# Patient Record
Sex: FEMALE | Race: WHITE
[De-identification: names, ages, dates, MRNs, and addresses within clinical notes are randomized per-mention and may not be internally consistent; named-entity substitution may affect disease eponyms.]

---

## 2019-03-01 ENCOUNTER — HOSPITAL ENCOUNTER (INPATIENT)
Dept: HOSPITAL 25 - PMRU | Age: 74
LOS: 7 days | Discharge: HOME | DRG: 561 | End: 2019-03-08
Attending: PHYSICAL MEDICINE & REHABILITATION | Admitting: PHYSICAL MEDICINE & REHABILITATION
Payer: MEDICARE

## 2019-03-01 DIAGNOSIS — Z79.1: ICD-10-CM

## 2019-03-01 DIAGNOSIS — E55.9: ICD-10-CM
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
## 2019-03-01 DIAGNOSIS — I95.9: ICD-10-CM

## 2019-03-01 DIAGNOSIS — Z82.49: ICD-10-CM

## 2019-03-01 DIAGNOSIS — R32: ICD-10-CM

## 2019-03-01 DIAGNOSIS — Z96.651: ICD-10-CM

## 2019-03-01 DIAGNOSIS — Z79.899: ICD-10-CM

## 2019-03-01 DIAGNOSIS — I10: ICD-10-CM

## 2019-03-01 DIAGNOSIS — Z80.42: ICD-10-CM

## 2019-03-01 DIAGNOSIS — E78.5: ICD-10-CM

## 2019-03-01 DIAGNOSIS — J44.9: ICD-10-CM

## 2019-03-01 DIAGNOSIS — Z91.040: ICD-10-CM

## 2019-03-01 DIAGNOSIS — R05: ICD-10-CM

## 2019-03-01 DIAGNOSIS — S72.141D: Primary | ICD-10-CM

## 2019-03-01 DIAGNOSIS — Z80.3: ICD-10-CM

## 2019-03-01 PROCEDURE — 85025 COMPLETE CBC W/AUTO DIFF WBC: CPT

## 2019-03-01 PROCEDURE — 36415 COLL VENOUS BLD VENIPUNCTURE: CPT

## 2019-03-01 PROCEDURE — 80053 COMPREHEN METABOLIC PANEL: CPT

## 2019-03-03 PROCEDURE — F08Z3ZZ FEEDING/EATING TREATMENT: ICD-10-PCS | Performed by: PHYSICAL MEDICINE & REHABILITATION

## 2019-03-03 PROCEDURE — F07Z5ZZ BED MOBILITY TREATMENT: ICD-10-PCS | Performed by: PHYSICAL MEDICINE & REHABILITATION

## 2019-03-03 PROCEDURE — F08Z0ZZ BATHING/SHOWERING TECHNIQUES TREATMENT: ICD-10-PCS | Performed by: PHYSICAL MEDICINE & REHABILITATION

## 2019-03-03 PROCEDURE — F07Z9ZZ GAIT TRAINING/FUNCTIONAL AMBULATION TREATMENT: ICD-10-PCS | Performed by: PHYSICAL MEDICINE & REHABILITATION

## 2019-03-03 PROCEDURE — F08Z1ZZ DRESSING TECHNIQUES TREATMENT: ICD-10-PCS | Performed by: PHYSICAL MEDICINE & REHABILITATION

## 2019-03-03 PROCEDURE — F07Z8ZZ TRANSFER TRAINING TREATMENT: ICD-10-PCS | Performed by: PHYSICAL MEDICINE & REHABILITATION

## 2019-03-03 RX ADMIN — ATORVASTATIN CALCIUM SCH MG: 10 TABLET, FILM COATED ORAL at 16:51

## 2019-03-03 RX ADMIN — ACETAMINOPHEN PRN MG: 325 TABLET ORAL at 16:50

## 2019-03-03 RX ADMIN — ACETAMINOPHEN PRN MG: 325 TABLET ORAL at 20:50

## 2019-03-03 RX ADMIN — Medication SCH TAB: at 20:50

## 2019-03-03 RX ADMIN — ENOXAPARIN SODIUM SCH: 40 INJECTION SUBCUTANEOUS at 15:40

## 2019-03-03 RX ADMIN — TRAMADOL HYDROCHLORIDE PRN MG: 50 TABLET, FILM COATED ORAL at 18:54

## 2019-03-03 RX ADMIN — DOCUSATE SODIUM SCH MG: 100 CAPSULE, LIQUID FILLED ORAL at 20:50

## 2019-03-03 NOTE — HP
CC:  Dr. Salazar; Dr. Velasquez *

 

REHABILITATION ADMISSION:

 

DATE OF ADMISSION:  03/03/19

 

PRIMARY CARE PROVIDER:  Dr. Salazar.

 

ORTHOPEDIC SURGEON:  Dr. Velasquez.

 

REASON FOR ADMISSION:  Right hip fracture.

 

HISTORY OF PRESENT ILLNESS:  This is a 73-year-old woman who on 02/25/19 was at 
[facility].  She was bent over picking up packages and next thing she knew she was 
on the floor.  She was originally taken to the emergency room at [facility], where she was found to have a right hip fracture and then she was 
transferred to [facility].  She was seen by Dr. Velasquez and taken to 
the OR on 02/27/19 for closed reduction and percutaneous IM fixation with short 
gamma nail of a right comminuted intertrochanteric fracture.  She was given 
weightbearing as tolerated restrictions and started on Lovenox 40 mg daily for 
4 weeks for DVT prophylaxis. In the postoperative phase, it has been noted that 
she has had some increasing tachycardia and some lower blood pressures.  She 
has been given boluses and increased rates of IV fluids for some orthostasis as 
well as calcium supplementation.  This has helped some, but she is remained 
with mild degrees of tachycardia and systolic blood pressures in the 90s 
intermittently.  At times, she may feel a little lightheaded in transitioning 
from sitting to standing.  Some dizziness predated her fall.  She denies any 
chest pain, shortness of breath or pleurisy.  She has a chronic cough that 
predates her admission as well.  Prior to admission, she was independent with 
ADLs with mobility without any assistive device.  She was modified independent 
for toileting and showering using adaptive equipment.  With physical therapy 
here, she has required a moderate amount of assistance for bed mobility, 
minimal amount of assistance for transferring with a rolling walker and contact 
guard assistance for ambulating up to 50 feet.  With occupational therapy, she 
has required total assist for lower body dressing, maximum amount of assistance 
for bathing and a total amount assistance for toileting.

 

PAST MEDICAL HISTORY:

1.  Hyperlipidemia.

2.  Hypertension.

3.  Status post right total knee replacement by Dr. Gautam.

4.  Status post tonsillectomy and adenoidectomy.

5.  History of COPD.

 

MEDICATIONS:  Currently:

1.  Multivitamin daily.

2.  Prenatal multivitamin daily.

3.  Vitamin C 250 mg daily.

4.  Lipitor 10 mg daily, substitute for Simvastatin 20 mg daily.

5.  Zyrtec 10 mg daily, substitute for Claritin 10 mg daily.

6.  Anoro Ellipta  62.5/25 one puff daily, patient using her own.

7.  Flonase 2 sprays bilaterally daily, which is substituting for Nasonex that 
she uses at home.

8.  Tylenol p.r.n.

9.  Lovenox 40 mg subcutaneously daily.

10.  Roxycodone 5 mg q.4 hours p.r.n. pain.

11.  Tramadol 50 mg q. 6 hours p.r.n. pain.

12.  Colace 100 mg b.i.d.

13.  Senna 2 tablets q.p.m.

14.  Tessalon 100 mg t.i.d. p.r.n. cough that she has only taken once.

 

ALLERGIES:  LATEX and no known drug allergies.

 

FAMILY HISTORY:  Mother, hypertension.  Father, prostate cancer.  Sister, 
breast cancer.

 

SOCIAL HISTORY:  She lives alone outside of [place].  She is  since 
July 2018.  No smoking or alcohol.  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  Her daughter-in-law, Sonia Sanchez, is her 
healthcare proxy if she cannot make decisions for herself.  Her phone number is 
[phone].  Her home is 1 level with no steps to enter.  There is 1 step 
however into her washroom.

 

REVIEW OF SYSTEMS:  See history of present illness and past medical history.  
The remainder of 13-system review was completed.  No other significant findings.

 

                               PHYSICAL EXAMINATION

 

GENERAL: Well developed, well nourished, appearing stated age. 



Mental Status: No acute distress. Alert and oriented x3.

 

VITAL SIGNS:  Temperature 98.2, heart rate 101, respirations 16, oxygen 
saturation 95% on room air, blood pressure 93/64.

 

HEENT:  Normocephalic, atraumatic.  Oropharynx is clear.  Moist mucous 
membranes.

 

LUNGS:  Shows bilateral inspiratory rales at the bases.

 

ABDOMEN:  Active bowel sounds.  Soft, nontender, nondistended.

 

EXTREMITIES:  No clubbing or cyanosis.  She has swelling throughout the right 
thigh and lower leg, more so than the left.  Negative Homans'.  No calf 
tenderness.

 

MUSCULOSKELETAL EXAM:  She has functional range of motion of all her major 
joints with limited testing of the right hip and knee secondary to her recent 
surgery.

 

NEUROLOGICAL EXAM:  Cranial nerves II through XII are intact.  Upper and lower 
extremity motor 5/5 bilaterally with normal sensation with limited testing at 
the right hip and knee secondary to pain.

 

 LABORATORY DATA:  Today, her white blood cell count is 6.5, hemoglobin 9, 
hematocrit 27, platelets 150.  Sodium 139, potassium 3.8, BUN 11, creatinine 
0.47, calcium 7.9, ionized calcium 1.14.  Of note, she had a normal lactic acid 
level of 1.8 on 03/01/19 and serum osmolality was 289.

 

IMPRESSION:  A 73-year-old woman with right hip fracture.  She will be admitted 
to [facility], so she can return to independent living.

 

PLAN:

1.  Right hip fracture:  She needs to follow up with Dr. Velasquez.  Continue 
weightbearing as tolerated to the right lower extremity.  She has been taking 
minimal pain medicines and I will simplify her regimen to tramadol alone on an 
as needed basis.  If she needs additional medication, we could add back in 
oxycodone 5 mg q.4 hours p.r.n.

2.  DVT prophylaxis:  Continue with Lovenox.  She will need to be taught how to 
do this.

3.  Tachycardia and mild hypotension:  She is still being evaluated by the 
hospitalist for this.  There is concern of increased urine output and they have 
ordered labs to evaluate for diabetes insipidus and endocrinology consultation.
  Continue to encourage oral fluids, but be aware of fluid overload as she 
already has some rales in both of her lungs.

4.  Fluid, electrolytes, and nutrition:  She has a history of vitamin D 
deficiency and normally takes a calcium supplement.  I will also add an Oscal D 
one p.o. b.i.d. for now and she will have routine labs starting tomorrow.

5.  Acute postoperative anemia:  Follow up with labs tomorrow.  Some of this 
may be dilutional as she has been receiving IV fluids to try to minimize 
tachycardia and improve her blood pressure.

6.  Chronic obstructive pulmonary disease:  Continue with her current 
medications.

7.  Impaired mobility:  She will be seen by Physical Therapy for bed mobility, 
transfer, gait, and stair training using the least restrictive assistive device.

8.  Impaired self-care:  She will be seen by Occupational Therapy for ADL and 
IADL training and equipment evaluation.

9.  Advance directives.  She is a full code.  If she cannot make decisions for 
herself, her daughter-in-law, Sonia Sanchez makes decisions for her.  Her phone 
number is [phone].

 

ESTIMATED LENGTH OF STAY:  Approximately 10 to 14 days and return to home.

 

 

 

[id number]/CPS #: [id number]

JAYDEN

## 2019-03-04 LAB
ALBUMIN SERPL BCG-MCNC: 2.7 G/DL (ref 3.2–5.2)
ALBUMIN/GLOB SERPL: 1 {RATIO} (ref 1–3)
ALP SERPL-CCNC: 55 U/L (ref 34–104)
ALT SERPL W P-5'-P-CCNC: 15 U/L (ref 7–52)
ANION GAP SERPL CALC-SCNC: 5 MMOL/L (ref 2–11)
AST SERPL-CCNC: 23 U/L (ref 13–39)
BASOPHILS # BLD AUTO: 0 10^3/UL (ref 0–0.2)
BUN SERPL-MCNC: 11 MG/DL (ref 6–24)
BUN/CREAT SERPL: 20.8 (ref 8–20)
CALCIUM SERPL-MCNC: 8.4 MG/DL (ref 8.6–10.3)
CHLORIDE SERPL-SCNC: 106 MMOL/L (ref 101–111)
EOSINOPHIL # BLD AUTO: 0.7 10^3/UL (ref 0–0.6)
GLOBULIN SER CALC-MCNC: 2.6 G/DL (ref 2–4)
GLUCOSE SERPL-MCNC: 106 MG/DL (ref 70–100)
HCO3 SERPL-SCNC: 28 MMOL/L (ref 22–32)
HCT VFR BLD AUTO: 29 % (ref 35–47)
HGB BLD-MCNC: 9.7 G/DL (ref 12–16)
LYMPHOCYTES # BLD AUTO: 1.5 10^3/UL (ref 1–4.8)
MCH RBC QN AUTO: 32 PG (ref 27–31)
MCHC RBC AUTO-ENTMCNC: 33 G/DL (ref 31–36)
MCV RBC AUTO: 96 FL (ref 80–97)
MONOCYTES # BLD AUTO: 0.6 10^3/UL (ref 0–0.8)
NEUTROPHILS # BLD AUTO: 3.5 10^3/UL (ref 1.5–7.7)
NRBC # BLD AUTO: 0 10^3/UL
NRBC BLD QL AUTO: 0.1
PLATELET # BLD AUTO: 184 10^3/UL (ref 150–450)
POTASSIUM SERPL-SCNC: 3.8 MMOL/L (ref 3.5–5)
PROT SERPL-MCNC: 5.3 G/DL (ref 6.4–8.9)
RBC # BLD AUTO: 3.07 10^6/UL (ref 4–5.4)
SODIUM SERPL-SCNC: 139 MMOL/L (ref 135–145)
WBC # BLD AUTO: 6.3 10^3/UL (ref 3.5–10.8)

## 2019-03-04 RX ADMIN — Medication SCH TAB: at 20:41

## 2019-03-04 RX ADMIN — Medication SCH TAB: at 08:46

## 2019-03-04 RX ADMIN — UMECLIDINIUM BROMIDE AND VILANTEROL TRIFENATATE SCH INH: 62.5; 25 POWDER RESPIRATORY (INHALATION) at 09:40

## 2019-03-04 RX ADMIN — CETIRIZINE HYDROCHLORIDE SCH MG: 10 TABLET, FILM COATED ORAL at 08:46

## 2019-03-04 RX ADMIN — ACETAMINOPHEN PRN MG: 325 TABLET ORAL at 12:09

## 2019-03-04 RX ADMIN — DOCUSATE SODIUM SCH MG: 100 CAPSULE, LIQUID FILLED ORAL at 20:41

## 2019-03-04 RX ADMIN — ACETAMINOPHEN PRN MG: 325 TABLET ORAL at 03:51

## 2019-03-04 RX ADMIN — THERA TABS SCH TAB: TAB at 08:46

## 2019-03-04 RX ADMIN — ATORVASTATIN CALCIUM SCH MG: 10 TABLET, FILM COATED ORAL at 16:52

## 2019-03-04 RX ADMIN — OXYCODONE HYDROCHLORIDE AND ACETAMINOPHEN SCH MG: 500 TABLET ORAL at 08:45

## 2019-03-04 RX ADMIN — FLUTICASONE PROPIONATE SCH SPRAY: 50 SPRAY, METERED NASAL at 08:46

## 2019-03-04 RX ADMIN — DOCUSATE SODIUM SCH MG: 100 CAPSULE, LIQUID FILLED ORAL at 08:46

## 2019-03-04 RX ADMIN — TRAMADOL HYDROCHLORIDE PRN MG: 50 TABLET, FILM COATED ORAL at 20:41

## 2019-03-04 RX ADMIN — ENOXAPARIN SODIUM SCH MG: 40 INJECTION SUBCUTANEOUS at 09:40

## 2019-03-04 RX ADMIN — TRAMADOL HYDROCHLORIDE PRN MG: 50 TABLET, FILM COATED ORAL at 08:46

## 2019-03-04 NOTE — PN
Progress Note


Date of Service: 03/04/19


Note: 


BERONICA COLLINS was visited. Therapy notes read and reviewed. She has had a 

chronic cough for a year now and her doctor could not figure out why. She was 

supposed to see Dr. Mckeon, but broke her hip.  Her BPs have been soft








Current Medications: 


 Active Medications











Generic Name Dose Route Start Last Admin





  Trade Name Freq  PRN Reason Stop Dose Admin


 


Acetaminophen  650 mg  03/03/19 09:45  03/04/19 12:09





  Tylenol Tab*  PO   650 mg





  Q4H PRN   Administration





  FEVER > 101   





     





     





     


 


Al Hydrox/Mg Hydrox/Simethicone  30 ml  03/03/19 09:45  





  Maalox Plus*  PO   





  Q6H PRN   





  INDIGESTION   





     





     





     


 


Ascorbic Acid  250 mg  03/04/19 09:00  03/04/19 08:45





  Vitamin C  Tab*  PO   250 mg





  DAILY FARZANEH   Administration





     





     





     





     


 


Atorvastatin Calcium  10 mg  03/03/19 17:00  03/04/19 16:52





  Lipitor*  PO   10 mg





  1700 FARZANEH   Administration





     





     





     





     


 


Benzonatate  100 mg  03/03/19 10:01  





  Tessalon Cap*  PO   





  TID PRN   





  COUGH   





     





     





     


 


Bisacodyl  10 mg  03/03/19 09:45  





  Dulcolax Supp*  KS   





  DAILY PRN   





  CONSTIPATION   





     





     





     


 


Calcium/Vitamin D  1 tab  03/03/19 21:00  03/04/19 08:46





  Oscal D Tab 250/125*  PO   1 tab





  BID FARZANEH   Administration





     





     





     





     


 


Cetirizine HCl  10 mg  03/04/19 09:00  03/04/19 08:46





  Zyrtec*  PO   10 mg





  DAILY FARZANEH   Administration





     





     





     





     


 


Docusate Sodium  100 mg  03/03/19 21:00  03/04/19 08:46





  Colace Cap*  PO   100 mg





  BID FARZANEH   Administration





     





     





     





     


 


Enoxaparin Sodium  40 mg  03/03/19 10:00  03/04/19 09:40





  Lovenox(*)  SUBCUT   40 mg





  Q24H FARZANEH   Administration





     





     





     





     


 


Fluticasone Propionate  2 spray  03/04/19 09:00  03/04/19 08:46





  Flonase Nasal Spray 50mcg*  BOTH NARES   2 spray





  DAILY FARZANEH   Administration





     





     





     





     


 


Magnesium Hydroxide  30 ml  03/03/19 09:45  





  Milk Of Magnesia Liq*  PO   





  Q6H PRN   





  CONSTIPATION   





     





     





     


 


Multivitamins  1 tab  03/04/19 09:00  03/04/19 08:46





  Prenatal Vitamin Tab*  PO   1 tab





  DAILY FARZANEH   Administration





     





     





     





     


 


Multivitamins/Minerals  1 tab  03/04/19 09:00  03/04/19 08:46





  Theragran/Minerals Tab*  PO   1 tab





  DAILY FARZANEH   Administration





     





     





     





     


 


Senna  2 tab  03/03/19 09:45  





  Senokot Tab*  PO   





  BEDTIME PRN   





  CONSTIPATION   





     





     





     


 


Tramadol HCl  50 mg  03/03/19 10:00  03/04/19 08:46





  Ultram*  PO   50 mg





  Q6H PRN   Administration





  PAIN   





     





     





     


 


Umeclidinium/Vilanterol  1 inh  03/04/19 09:00  03/04/19 09:40





  Anoro 62.5/25 Ellipta Device (Nf)  INH   1 inh





  DAILY FARZANEH   Administration





     





     





  Protocol   





     











Vital Signs: 


 Vital Signs











Temp Pulse Resp BP Pulse Ox


 


 97.9 F   107   22   86/51   99 


 


 03/04/19 16:45  03/04/19 16:45  03/04/19 16:45  03/04/19 16:45  03/04/19 17:01











Lab Results: 


 Laboratory Results - last 24 hr











  03/04/19 03/04/19





  04:54 04:54


 


WBC  6.3 


 


RBC  3.07 L 


 


Hgb  9.7 L 


 


Hct  29 L 


 


MCV  96 


 


MCH  32 H 


 


MCHC  33 


 


RDW  14 


 


Plt Count  184 


 


MPV  8.0 


 


Neut % (Auto)  55.3 


 


Lymph % (Auto)  23.9 


 


Mono % (Auto)  8.8 


 


Eos % (Auto)  11.4 


 


Baso % (Auto)  0.6 


 


Absolute Neuts (auto)  3.5 


 


Absolute Lymphs (auto)  1.5 


 


Absolute Monos (auto)  0.6 


 


Absolute Eos (auto)  0.7 H 


 


Absolute Basos (auto)  0 


 


Absolute Nucleated RBC  0 


 


Nucleated RBC %  0.1 


 


Sodium   139


 


Potassium   3.8


 


Chloride   106


 


Carbon Dioxide   28


 


Anion Gap   5


 


BUN   11


 


Creatinine   0.53


 


Est GFR ( Amer)   136.1


 


Est GFR (Non-Af Amer)   112.5


 


BUN/Creatinine Ratio   20.8 H


 


Glucose   106 H


 


Calcium   8.4 L


 


Total Bilirubin   0.80


 


AST   23


 


ALT   15


 


Alkaline Phosphatase   55


 


Total Protein   5.3 L


 


Albumin   2.7 L


 


Globulin   2.6


 


Albumin/Globulin Ratio   1.0











Exam: 





LUNGS: Clear bilaterally


HEART: regular rhythm


ABDOMEN: Soft


EXTREMITIES: RLE Wound clean


NEUROLOGIC: A & O x 3. Sensation intact. Muscle strength 5/5 UE, RLE at least 4/

5


Assessment/Plan: 





1. Right Hip Fracture: WBAT. Follow up with Dr. Velasquez


2. Hypotension: Monitor


3. DVT Prophylaxis: Lovenox for 4 weeks post-op


4. Advanced Directives: Full code. Daughter in law is HCP


5. COPD: Anoro/Flonase











03/04/19 18:43





03/04/19 18:44

## 2019-03-05 RX ADMIN — TRAMADOL HYDROCHLORIDE PRN MG: 50 TABLET, FILM COATED ORAL at 10:07

## 2019-03-05 RX ADMIN — ACETAMINOPHEN PRN MG: 325 TABLET ORAL at 05:21

## 2019-03-05 RX ADMIN — FLUTICASONE PROPIONATE SCH SPRAY: 50 SPRAY, METERED NASAL at 08:40

## 2019-03-05 RX ADMIN — TRAMADOL HYDROCHLORIDE PRN MG: 50 TABLET, FILM COATED ORAL at 23:28

## 2019-03-05 RX ADMIN — DOCUSATE SODIUM SCH MG: 100 CAPSULE, LIQUID FILLED ORAL at 21:00

## 2019-03-05 RX ADMIN — ACETAMINOPHEN PRN MG: 325 TABLET ORAL at 21:03

## 2019-03-05 RX ADMIN — OXYCODONE HYDROCHLORIDE AND ACETAMINOPHEN SCH MG: 500 TABLET ORAL at 08:39

## 2019-03-05 RX ADMIN — ATORVASTATIN CALCIUM SCH MG: 10 TABLET, FILM COATED ORAL at 16:10

## 2019-03-05 RX ADMIN — SENNOSIDES PRN TAB: 8.6 TABLET, FILM COATED ORAL at 21:00

## 2019-03-05 RX ADMIN — Medication SCH TAB: at 08:41

## 2019-03-05 RX ADMIN — THERA TABS SCH TAB: TAB at 08:41

## 2019-03-05 RX ADMIN — UMECLIDINIUM BROMIDE AND VILANTEROL TRIFENATATE SCH INH: 62.5; 25 POWDER RESPIRATORY (INHALATION) at 10:08

## 2019-03-05 RX ADMIN — Medication SCH TAB: at 21:01

## 2019-03-05 RX ADMIN — FLUTICASONE PROPIONATE SCH SPRAY: 50 SPRAY, METERED NASAL at 21:04

## 2019-03-05 RX ADMIN — Medication SCH TAB: at 08:40

## 2019-03-05 RX ADMIN — TRAMADOL HYDROCHLORIDE PRN MG: 50 TABLET, FILM COATED ORAL at 16:10

## 2019-03-05 RX ADMIN — DOCUSATE SODIUM SCH MG: 100 CAPSULE, LIQUID FILLED ORAL at 08:40

## 2019-03-05 RX ADMIN — CETIRIZINE HYDROCHLORIDE SCH MG: 10 TABLET, FILM COATED ORAL at 08:40

## 2019-03-05 RX ADMIN — ENOXAPARIN SODIUM SCH MG: 40 INJECTION SUBCUTANEOUS at 10:08

## 2019-03-05 NOTE — PMRUTEAM
PMRU: Team Meeting


Current Status: 


 Nursing: Current Status











Skin Deviations [Right Groin]  Rash


 


Skin Deviations [Bilateral     Other





Foot]                          


 


Skin Deviations [Bilateral Arm Bruise





]                              


 


Skin Deviations [Right Hip]    Incision


 


Skin Deviation Description [   s/p moisture per pt report; powder applied





Right Groin]                   


 


Skin Deviation Description [   dry skin





Bilateral Foot]                


 


Skin Deviation Description [   Proximal drsg in place





Right Hip]                     Distal incisions healing, MADELINE - staples intact


 


Bladder Current Status         Stress incontinence - walking to bathroom to void


 


Bowel Current Status           Walking to bathroom to void


 


Nutrition Current Status       75% of most meals


 


Medication Current Status      Pain meds given











 Physical Therapy: Current Status











Bed Mobility Assistance        Min Assist


 


Transfer Mobility Assistance   Contact Guard Assist


 


Transfer/Bed Mobility          Rolling Walker





Recommended Devices            


 


Ambulation Assistance          Supervision


 


Ambulation Assistive Devices   Rolling Walker


 


Number of Feet Patient         105





Ambulated                      


 


Stairs Assistance              Contact Guard Assist


 


Stairs Recommended Devices     Two Rails


 


Number of Stairs               5


 


Curb                           Not Tested














 Occupational Therapy: Current Status











Upper Body Dressing            Supervision


 


Lower Body Dressing            Max Asst


 


Bathing                        Mod Assist


 


Toileting                      Contact Guard Assist


 


Toilet Transfer                Contact Guard Assist


 


Shower Transfer                Contact Guard Assist


 


Eating                         Independent














 Rec Therapy: Current Status











Summary of Assessment and      Writer met with the patient to complete RT





Clinical Impression            assessment. Patient was open to meeting with





 writer and was visiting with her son at the time.





 Patient presented euthymic with congruent affect.





 With prompting, the patient identified a few





 interests. 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





 have time for hobbies. Patient has a puzzle book





 in her room, is open to continued leisure visits.


 


Treatment Goals                Patient will engage in recreation and leisure





 activities while on the unit.


 


Treatment Plan                 Provide and encourage involvement in RT services.





 Meet with patient regularly for 1:1 leisure





 sessions.














 Social Work: Current Status











Discharge Plan                 Return home with out patient therapy and family





 support


 


Potential for Family Training  TBD (pt lives alone but has support from her





 daughter in law)


 


Anticipated Discharge          Home





Destination                    


 


Anticipated Discharge          no available home care [facility] in [facility]





Destination Comment            


 


Discharge With                 out patient therapy and family support














 Nutrition: Current Status











Monitoring                     Pt admitted to PMRU s/p R hip fx and closed





 reduction and percutaneous IM fixation 2/27/19. Pt





 eating % of meals so far; no evidence





 difficulty chewing/swallowing per nursing





 assessments. Soft formed BM noted yesterday; loose





 BM today. Skin intact; Curry 20 (low risk). Pt





 on appropriate regular diet. Full nutrition





 assessment to follow per protocol.














Goals: 


 Physical Therapy: Initial Goals











Bed Mobility Assistance        Independent


 


Transfer Mobility Assistance   Independent


 


Transfer/Bed Mobility          Rolling Walker





Recommended Devices            


 


Ambulation                     Independent


 


Ambulation Recommended Devices Rolling Walker


 


Ambulation Distance            150


 


Stairs Assistance              Independent


 


Stair Recommended Devices      Two Rails


 


Number of Stairs               5


 


Home Exercise Program          Independent





Assistance                     














 Physical Therapy: Updated Goals











Transfer/Bed Mobility          Rolling Walker





Recommended Devices            














 Occupational Therapy: Initial Goals











Goals to be Completed in (Days 7-9





)                              


 


Upper Body Bathing Routine     Independent


 


Lower Body Bathing Routine     Modified Independent with


 


Upper Body Dressing Routine    Independent


 


Lower Body Dressing Routine    Modified Independent with


 


Toilet Hygeine and Clothing    Modified Independent with





Management Routine             


 


Toilet Transfer Routine        Modified Independent with


 


Step-In Shower Transfer        Supervision/Set Up





Routine                        


 


Functional Transfers for ADL   Modified Independent with


 


Grooming Routine               Independent


 


Feeding Routine                Independent














 Nursing: Goals











Bladder Goal                   Independent


 


Bowel Goal                     Independent


 


Nutrition Goal                 Independent - 100% of all meals


 


Medication Goal                Independent














 Nutrition: Goals











Intervention Goals             1. Intake will remain adequate to promote post-op





 healing





 2. Pt will maintain regular bowel pattern without





 constipation (or diarrhea)











 Social Work: Goals











Discharge Plan                 Return home with out patient therapy and family





 support


 


Potential for Family Training  TBD (pt lives alone but has support from her





 daughter in law)


 


Anticipated Discharge          Home





Destination                    


 


Anticipated Discharge          no available home care [facility] in [facility]





Destination Comment            


 


Discharge With                 out patient therapy and family support

















Care Plan: 


 Care Plan





ADL's - Improve/Maintain                Start:  03/03/19 16:41              


Freq:   DAILY                           Status: Active      Target:         


Protocol:                                                                   








Activity Type Activity Date Activity User E-Sign Co-Sign Detail





Recorded Client Recorded Date Recorded By   


 


Document 03/04/19 14:51 [id number]   





PMRU-C04 03/04/19 14:52 [id number]   














  03/04/19





  14:51


 


PMRU Outcome: ADL's/ADL Transfers 


 


Orders/Interventions Occupational





 Therapy





 Evaluation &





 Treatment





 Communication





 Tool in Patient





 Room


 


Patient to receive OT 5x/wk for  Therex





min/day Self Care





 Management


 


UE/LE ADL's with Assist Yes: mod I


 


ADL Transfers with Assist Yes: mod I


 


Toileting: Transfers,Clothing Management Yes: mod I





,Hygeine w/Assist 


 


Progression Toward Outcome/Goals Progressing


 


Outcome/Goals Met Pt is a 73 year





 old female s/p





 fall with





 right hip fx s/





 p gamma nailing





 with WBAT





 precautions. Pt





 currently has





 R hip pain,





 limited R hip





 AROM, decreased





 balance,





 decreased





 endurance which





 limits





 independence





 with bathing,





 dressing,





 toileting, and





 transfers. Pt





 will benefit





 from acute





 skilled OT





 intervention in





 a





 rehabilitation





 setting to





 maximize





 independence





 and safety with





 ADLs and ADL





 transfers. Pt





 is agreeable to





 OT POC.








Cardiovascular- Improve/Maintain        Start:  03/03/19 16:41              


Freq:   QSHIFT                          Status: Active      Target:         


Protocol:                                                                   








Activity Type Activity Date Activity User E-Sign Co-Sign Detail





Recorded Client Recorded Date Recorded By   


 


Document 03/04/19 20:00 [id number]   





PMRU-C03 03/04/19 21:35 [id number]   














  03/04/19





  20:00


 


PMRU Outcome: Cardiovascular 


 


Vital Signs q Shift for 48hrs Then BID Yes


 


Daily Weight Ordered No


 


Current Cardiovascular Outcome/Goal Maintain/





 Achieve





 Baseline HR, BP





 , Perfusion





 Maintain/





 Achieve





 Hemodynamic





 Stability





 Free of





 Abnormal





 Cardiac





 Symptoms


 


Progression Toward Outcome/Goal Progressing








DVT Prophylaxis- Improve/Maintain       Start:  03/03/19 16:41              


Freq:   QSHIFT                          Status: Active      Target:         


Protocol:                                                                   








Activity Type Activity Date Activity User E-Sign Co-Sign Detail





Recorded Client Recorded Date Recorded By   


 


Document 03/04/19 20:00 [id number]   





PMRU-C03 03/04/19 21:35 [id number]   














  03/04/19





  20:00


 


PMRU Outcome: DVT Prophylaxis 


 


Outcome/Goals Remains Free of





 DVT





 Complies with





 DVT Prophylaxis





 /Treatment





 TEDS Stockings





 on Every AM,





 Off at HS


 


Progression Toward Outcome/Goals Progressing








Discharge Planning - Improve/Maintain   Start:  03/03/19 16:41              


Freq:   DAILY                           Status: Active      Target:         


Protocol:                                                                   








Activity Type Activity Date Activity User E-Sign Co-Sign Detail





Recorded Client Recorded Date Recorded By   


 


Document 03/05/19 00:25 [id number]   





PMRU-C14 03/05/19 00:25 [id number]   














  03/05/19





  00:25


 


PMRU Outcome: Discharge Planning 


 


Update Patient Family No


 


Outcome/Goals Demonstrates





 Understanding





 of Discharge





 Plan


 


Progression Toward Outcome/Goals Progressing








Education-Improve/Maintain              Start:  03/03/19 16:41              


Freq:   QSHIFT                          Status: Active      Target:         


Protocol:                                                                   








Activity Type Activity Date Activity User E-Sign Co-Sign Detail





Recorded Client Recorded Date Recorded By   


 


Document 03/04/19 20:00 [id number]   





PMRU-C03 03/04/19 21:35 [id number]   














  03/04/19





  20:00


 


PMRU Outcome: Education 


 


Outcome/Goals Demonstrate/





 Verbalize





 Understanding





 of Written





 Discharge





 Instructions





 Demonstrates





 Skills





 Encourage





 Questions








Pain/Comfort- Improve/Maintain          Start:  03/03/19 16:41              


Freq:   QSHIFT                          Status: Active      Target:         


Protocol:                                                                   








Activity Type Activity Date Activity User E-Sign Co-Sign Detail





Recorded Client Recorded Date Recorded By   


 


Document 03/04/19 20:00 [id number]   





[facility]-C03 03/04/19 21:35 [id number]   














  03/04/19





  20:00


 


PMRU Outcome: Pain/Comfort 


 


Outcome/Goals Demonstrates





 Knowledge and





 Use of





 Available





 Comfort





 Measures





 Achieves





 Acceptable





 Comfort/Pain





 Level as





 Determined by





 Patient/Condit





 Maintain





 Comfort Level





 Allowing





 Patient to





 Fully





 Participate in





 Rehab


 


Progression Toward Outcome/Goals Progressing








Respiratory - Improve/Maintain          Start:  03/03/19 16:41              


Freq:   QSHIFT                          Status: Active      Target:         


Protocol:                                                                   








Activity Type Activity Date Activity User E-Sign Co-Sign Detail





Recorded Client Recorded Date Recorded By   


 


Document 03/04/19 20:00 [id number]   





[facility] [place]-C03 03/04/19 21:35 [id number]   














  03/04/19





  20:00


 


PMRU Outcome: Respiratory 


 


Does Patient Have a Trach No


 


Outcome/Goals Maintain/





 Improve





 Activity





 Tolerance





 Prevent





 Pneumonia/





 Atelectasis


 


Progression Toward Outcome/Goals Progressing








Safety- Improve/Maintain                Start:  03/03/19 16:41              


Freq:   QSHIFT                          Status: Active      Target:         


Protocol:                                                                   








Activity Type Activity Date Activity User E-Sign Co-Sign Detail





Recorded Client Recorded Date Recorded By   


 


Document 03/04/19 20:00 [id number]   





[facility] [place]-C03 03/04/19 21:35 [id number]   














  03/04/19





  20:00


 


PMRU Outcome: Safety 


 


Outcome/Goals Remain Free of





 Injury or Harm





 Cooperates with





 Safety





 Measures for





 Least





 Restrictive





 Environment





 Prevent Falls/





 Injury


 


Progression Toward Outcome/Goals Progressing








Skin- Improve/Maintain                  Start:  03/03/19 16:41              


Freq:   QSHIFT                          Status: Active      Target:         


Protocol:                                                                   








Activity Type Activity Date Activity User E-Sign Co-Sign Detail





Recorded Client Recorded Date Recorded By   


 


Document 03/04/19 20:00 [id number]   





[facility] [place]-C03 03/04/19 21:35 [id number]   














  03/04/19





  20:00


 


PMRU Outcome: Skin 


 


Skin Risk Level Medium


 


Outcome/Goals Maintain/





 Improve Skin





 Intergrity





 Surgical





 Incisions





 Healing


 


Progression Toward Outcome/Goals Progressing














Medicine Note: 








Length of Stay:  1 week





Anticipated Discharge Destination: Home





Tentative Discharge Date:  03/12/19





Discharged to:  Home

## 2019-03-05 NOTE — PN
Progress Note


Date of Service: 03/05/19


Note: 


BERONICA COLLINS was visited. Therapy notes read and reviewed. She was discussed 

in interdisciplinary team rounds. Some concerns about incontinence when she 

stands. Will check PVR 








Current Medications: 


 Active Medications











Generic Name Dose Route Start Last Admin





  Trade Name Freq  PRN Reason Stop Dose Admin


 


Acetaminophen  650 mg  03/03/19 09:45  03/05/19 05:21





  Tylenol Tab*  PO   650 mg





  Q4H PRN   Administration





  FEVER > 101   





     





     





     


 


Al Hydrox/Mg Hydrox/Simethicone  30 ml  03/03/19 09:45  





  Maalox Plus*  PO   





  Q6H PRN   





  INDIGESTION   





     





     





     


 


Ascorbic Acid  250 mg  03/04/19 09:00  03/05/19 08:39





  Vitamin C  Tab*  PO   250 mg





  DAILY FARZANEH   Administration





     





     





     





     


 


Atorvastatin Calcium  10 mg  03/03/19 17:00  03/05/19 16:10





  Lipitor*  PO   10 mg





  1700 FARZANEH   Administration





     





     





     





     


 


Benzonatate  100 mg  03/03/19 10:01  





  Tessalon Cap*  PO   





  TID PRN   





  COUGH   





     





     





     


 


Bisacodyl  10 mg  03/03/19 09:45  





  Dulcolax Supp*  GA   





  DAILY PRN   





  CONSTIPATION   





     





     





     


 


Calcium/Vitamin D  1 tab  03/03/19 21:00  03/05/19 08:40





  Oscal D Tab 250/125*  PO   1 tab





  BID FARZANEH   Administration





     





     





     





     


 


Cetirizine HCl  10 mg  03/04/19 09:00  03/05/19 08:40





  Zyrtec*  PO   10 mg





  DAILY FARZANEH   Administration





     





     





     





     


 


Docusate Sodium  100 mg  03/03/19 21:00  03/05/19 08:40





  Colace Cap*  PO   100 mg





  BID FARZANEH   Administration





     





     





     





     


 


Enoxaparin Sodium  40 mg  03/03/19 10:00  03/05/19 10:08





  Lovenox(*)  SUBCUT   40 mg





  Q24H FARZANEH   Administration





     





     





     





     


 


Fluticasone Propionate  2 spray  03/05/19 21:00  





  Flonase Nasal Spray 50mcg*  BOTH NARES   





  BID FARZANEH   





     





     





     





     


 


Magnesium Hydroxide  30 ml  03/03/19 09:45  





  Milk Of Magnesia Liq*  PO   





  Q6H PRN   





  CONSTIPATION   





     





     





     


 


Multivitamins  1 tab  03/04/19 09:00  03/05/19 08:41





  Prenatal Vitamin Tab*  PO   1 tab





  DAILY FARZANEH   Administration





     





     





     





     


 


Multivitamins/Minerals  1 tab  03/04/19 09:00  03/05/19 08:41





  Theragran/Minerals Tab*  PO   1 tab





  DAILY FARZANEH   Administration





     





     





     





     


 


Senna  2 tab  03/03/19 09:45  





  Senokot Tab*  PO   





  BEDTIME PRN   





  CONSTIPATION   





     





     





     


 


Tramadol HCl  50 mg  03/03/19 10:00  03/05/19 16:10





  Ultram*  PO   50 mg





  Q6H PRN   Administration





  PAIN   





     





     





     


 


Umeclidinium/Vilanterol  1 inh  03/04/19 09:00  03/05/19 10:08





  Anoro 62.5/25 Ellipta Device (Nf)  INH   1 inh





  DAILY FARZANEH   Administration





     





     





  Protocol   





     











Vital Signs: 


 Vital Signs











Temp Pulse Resp BP Pulse Ox


 


 97.8 F   111   18   99/65   100 


 


 03/05/19 15:44  03/05/19 15:44  03/05/19 16:10  03/05/19 15:44  03/05/19 15:44











Exam: 





LUNGS: Clear bilaterally


HEART: regular rhythm


ABDOMEN: Soft


EXTREMITIES: RLE Wound clean


NEUROLOGIC: A & O x 3. Sensation intact. Muscle strength 5/5 UE, RLE at least 4/

5


Assessment/Plan: 





1. Right Hip Fracture: WBAT. Follow up with Dr. Velasquez


2. Hypotension: Monitor


3. DVT Prophylaxis: Lovenox for 4 weeks post-op


4. Advanced Directives: Full code. Daughter in law is HCP


5. COPD: Anoro/Flonase


6. Chronic Cough: has been looked at and a variety of medications have been 

tried. 


7. Urinary Incontinence: Predates her fall. Will bladder scan to make sure not 

overflow incontinence

















03/05/19 19:07





03/05/19 19:08

## 2019-03-06 RX ADMIN — THERA TABS SCH TAB: TAB at 09:09

## 2019-03-06 RX ADMIN — TRAMADOL HYDROCHLORIDE PRN MG: 50 TABLET, FILM COATED ORAL at 09:10

## 2019-03-06 RX ADMIN — DOCUSATE SODIUM SCH: 100 CAPSULE, LIQUID FILLED ORAL at 09:04

## 2019-03-06 RX ADMIN — Medication SCH TAB: at 09:09

## 2019-03-06 RX ADMIN — ATORVASTATIN CALCIUM SCH MG: 10 TABLET, FILM COATED ORAL at 17:10

## 2019-03-06 RX ADMIN — ENOXAPARIN SODIUM SCH MG: 40 INJECTION SUBCUTANEOUS at 09:11

## 2019-03-06 RX ADMIN — FLUTICASONE PROPIONATE SCH SPRAY: 50 SPRAY, METERED NASAL at 09:12

## 2019-03-06 RX ADMIN — CETIRIZINE HYDROCHLORIDE SCH MG: 10 TABLET, FILM COATED ORAL at 09:09

## 2019-03-06 RX ADMIN — GUAIFENESIN SCH MG: 600 TABLET, EXTENDED RELEASE ORAL at 19:09

## 2019-03-06 RX ADMIN — UMECLIDINIUM BROMIDE AND VILANTEROL TRIFENATATE SCH INH: 62.5; 25 POWDER RESPIRATORY (INHALATION) at 09:13

## 2019-03-06 RX ADMIN — Medication SCH TAB: at 19:09

## 2019-03-06 RX ADMIN — Medication SCH TAB: at 09:10

## 2019-03-06 RX ADMIN — ACETAMINOPHEN PRN MG: 325 TABLET ORAL at 04:50

## 2019-03-06 RX ADMIN — DOCUSATE SODIUM SCH MG: 100 CAPSULE, LIQUID FILLED ORAL at 19:09

## 2019-03-06 RX ADMIN — OXYCODONE HYDROCHLORIDE AND ACETAMINOPHEN SCH MG: 500 TABLET ORAL at 09:09

## 2019-03-06 RX ADMIN — ACETAMINOPHEN PRN MG: 325 TABLET ORAL at 19:08

## 2019-03-06 RX ADMIN — FLUTICASONE PROPIONATE SCH SPRAY: 50 SPRAY, METERED NASAL at 19:11

## 2019-03-06 RX ADMIN — ENOXAPARIN SODIUM SCH MG: 40 INJECTION SUBCUTANEOUS at 09:10

## 2019-03-06 RX ADMIN — SENNOSIDES PRN TAB: 8.6 TABLET, FILM COATED ORAL at 19:09

## 2019-03-06 NOTE — PN
Progress Note


Date of Service: 03/06/19


Note: 


BERONICA COLLINS was visited. Therapy notes read and reviewed. She feels that 

her cough may have been better when she took Mucinex and Claritin. I will 

order. Otherwise doing okay. 








Current Medications: 


 Active Medications











Generic Name Dose Route Start Last Admin





  Trade Name Freq  PRN Reason Stop Dose Admin


 


Acetaminophen  650 mg  03/03/19 09:45  03/06/19 04:50





  Tylenol Tab*  PO   650 mg





  Q4H PRN   Administration





  FEVER > 101   





     





     





     


 


Al Hydrox/Mg Hydrox/Simethicone  30 ml  03/03/19 09:45  





  Maalox Plus*  PO   





  Q6H PRN   





  INDIGESTION   





     





     





     


 


Ascorbic Acid  250 mg  03/04/19 09:00  03/06/19 09:09





  Vitamin C  Tab*  PO   250 mg





  DAILY FARZNAEH   Administration





     





     





     





     


 


Atorvastatin Calcium  10 mg  03/03/19 17:00  03/06/19 17:10





  Lipitor*  PO   10 mg





  1700 FARZANEH   Administration





     





     





     





     


 


Benzonatate  100 mg  03/03/19 10:01  





  Tessalon Cap*  PO   





  TID PRN   





  COUGH   





     





     





     


 


Bisacodyl  10 mg  03/03/19 09:45  





  Dulcolax Supp*  MO   





  DAILY PRN   





  CONSTIPATION   





     





     





     


 


Calcium/Vitamin D  1 tab  03/03/19 21:00  03/06/19 09:10





  Oscal D Tab 250/125*  PO   1 tab





  BID FARZANEH   Administration





     





     





     





     


 


Docusate Sodium  100 mg  03/03/19 21:00  03/06/19 09:04





  Colace Cap*  PO   Not Given





  BID FARZANEH   





     





     





     





     


 


Enoxaparin Sodium  40 mg  03/06/19 09:00  03/06/19 09:10





  Lovenox(*)  SUBCUT   40 mg





  Q24HR FARZANEH   Administration





     





     





     





     


 


Fluticasone Propionate  2 spray  03/05/19 21:00  03/06/19 09:12





  Flonase Nasal Spray 50mcg*  BOTH NARES   2 spray





  BID FARZANEH   Administration





     





     





     





     


 


Guaifenesin  600 mg  03/06/19 21:00  





  Mucinex*  PO   





  BID FARZANEH   





     





     





     





     


 


Magnesium Hydroxide  30 ml  03/03/19 09:45  





  Milk Of Magnesia Liq*  PO   





  Q6H PRN   





  CONSTIPATION   





     





     





     


 


Multivitamins  1 tab  03/04/19 09:00  03/06/19 09:09





  Prenatal Vitamin Tab*  PO   1 tab





  DAILY FARZANEH   Administration





     





     





     





     


 


Multivitamins/Minerals  1 tab  03/04/19 09:00  03/06/19 09:09





  Theragran/Minerals Tab*  PO   1 tab





  DAILY FARZANEH   Administration





     





     





     





     


 


Pto: (Claritin D 120  120 mg  03/07/19 09:00  





  Mg)  PO   





  BID FARZANEH   





     





     





     





     


 


Senna  2 tab  03/03/19 09:45  03/05/19 21:00





  Senokot Tab*  PO   2 tab





  BEDTIME PRN   Administration





  CONSTIPATION   





     





     





     


 


Tramadol HCl  50 mg  03/03/19 10:00  03/06/19 09:10





  Ultram*  PO   50 mg





  Q6H PRN   Administration





  PAIN   





     





     





     


 


Umeclidinium/Vilanterol  1 inh  03/04/19 09:00  03/06/19 09:13





  Anoro 62.5/25 Ellipta Device (Nf)  INH   1 inh





  DAILY FARZANEH   Administration





     





     





  Protocol   





     











Vital Signs: 


 Vital Signs











Temp Pulse Resp BP Pulse Ox


 


 98.3 F   99   18   107/67   100 


 


 03/06/19 16:18  03/06/19 16:18  03/06/19 16:18  03/06/19 16:18  03/06/19 16:18











Exam: 





LUNGS: Clear bilaterally


HEART: regular rhythm


ABDOMEN: Soft


EXTREMITIES: RLE Wound clean


NEUROLOGIC: A & O x 3. Sensation intact. Muscle strength 5/5 UE, RLE at least 4/

5


Assessment/Plan: 





1. Right Hip Fracture: WBAT. Follow up with Dr. Velasquez


2. Hypotension: Monitor


3. DVT Prophylaxis: Lovenox for 4 weeks post-op


4. Advanced Directives: Full code. Daughter in law is HCP


5. COPD: Anoro/Flonase


6. Chronic Cough: has been looked at and a variety of medications have been 

tried. 


7. Urinary Incontinence: Predates her fall. Bladder scan showed no PVR























03/06/19 18:29





03/06/19 18:30

## 2019-03-07 RX ADMIN — Medication SCH TAB: at 21:42

## 2019-03-07 RX ADMIN — Medication SCH MG: at 10:00

## 2019-03-07 RX ADMIN — OXYCODONE HYDROCHLORIDE AND ACETAMINOPHEN SCH MG: 500 TABLET ORAL at 10:01

## 2019-03-07 RX ADMIN — ACETAMINOPHEN PRN MG: 325 TABLET ORAL at 00:20

## 2019-03-07 RX ADMIN — Medication SCH TAB: at 10:01

## 2019-03-07 RX ADMIN — TRAMADOL HYDROCHLORIDE PRN MG: 50 TABLET, FILM COATED ORAL at 10:01

## 2019-03-07 RX ADMIN — Medication SCH MG: at 21:45

## 2019-03-07 RX ADMIN — UMECLIDINIUM BROMIDE AND VILANTEROL TRIFENATATE SCH INH: 62.5; 25 POWDER RESPIRATORY (INHALATION) at 10:03

## 2019-03-07 RX ADMIN — TRAMADOL HYDROCHLORIDE PRN MG: 50 TABLET, FILM COATED ORAL at 16:32

## 2019-03-07 RX ADMIN — ACETAMINOPHEN PRN MG: 325 TABLET ORAL at 21:43

## 2019-03-07 RX ADMIN — DOCUSATE SODIUM SCH MG: 100 CAPSULE, LIQUID FILLED ORAL at 10:01

## 2019-03-07 RX ADMIN — FLUTICASONE PROPIONATE SCH SPRAY: 50 SPRAY, METERED NASAL at 10:00

## 2019-03-07 RX ADMIN — THERA TABS SCH TAB: TAB at 10:00

## 2019-03-07 RX ADMIN — FLUTICASONE PROPIONATE SCH SPRAY: 50 SPRAY, METERED NASAL at 21:48

## 2019-03-07 RX ADMIN — ENOXAPARIN SODIUM SCH MG: 40 INJECTION SUBCUTANEOUS at 10:04

## 2019-03-07 RX ADMIN — Medication SCH TAB: at 10:00

## 2019-03-07 RX ADMIN — DOCUSATE SODIUM SCH MG: 100 CAPSULE, LIQUID FILLED ORAL at 21:42

## 2019-03-07 RX ADMIN — SENNOSIDES PRN TAB: 8.6 TABLET, FILM COATED ORAL at 21:43

## 2019-03-07 RX ADMIN — GUAIFENESIN SCH MG: 600 TABLET, EXTENDED RELEASE ORAL at 10:00

## 2019-03-07 RX ADMIN — GUAIFENESIN SCH MG: 600 TABLET, EXTENDED RELEASE ORAL at 21:42

## 2019-03-07 RX ADMIN — ATORVASTATIN CALCIUM SCH MG: 10 TABLET, FILM COATED ORAL at 16:32

## 2019-03-08 VITALS — SYSTOLIC BLOOD PRESSURE: 106 MMHG | DIASTOLIC BLOOD PRESSURE: 66 MMHG

## 2019-03-08 RX ADMIN — Medication SCH TAB: at 10:27

## 2019-03-08 RX ADMIN — TRAMADOL HYDROCHLORIDE PRN MG: 50 TABLET, FILM COATED ORAL at 14:35

## 2019-03-08 RX ADMIN — GUAIFENESIN SCH MG: 600 TABLET, EXTENDED RELEASE ORAL at 10:28

## 2019-03-08 RX ADMIN — UMECLIDINIUM BROMIDE AND VILANTEROL TRIFENATATE SCH INH: 62.5; 25 POWDER RESPIRATORY (INHALATION) at 10:29

## 2019-03-08 RX ADMIN — Medication SCH TAB: at 10:29

## 2019-03-08 RX ADMIN — Medication SCH MG: at 10:28

## 2019-03-08 RX ADMIN — ACETAMINOPHEN PRN MG: 325 TABLET ORAL at 01:44

## 2019-03-08 RX ADMIN — FLUTICASONE PROPIONATE SCH SPRAY: 50 SPRAY, METERED NASAL at 10:28

## 2019-03-08 RX ADMIN — THERA TABS SCH TAB: TAB at 10:28

## 2019-03-08 RX ADMIN — DOCUSATE SODIUM SCH MG: 100 CAPSULE, LIQUID FILLED ORAL at 10:28

## 2019-03-08 RX ADMIN — ENOXAPARIN SODIUM SCH MG: 40 INJECTION SUBCUTANEOUS at 10:28

## 2019-03-08 RX ADMIN — ACETAMINOPHEN PRN MG: 325 TABLET ORAL at 10:30

## 2019-03-08 RX ADMIN — OXYCODONE HYDROCHLORIDE AND ACETAMINOPHEN SCH MG: 500 TABLET ORAL at 10:27

## 2019-03-10 NOTE — DS
CC:  Dr. Greg Salazar *

 

DISCHARGE SUMMARY:

 

DATE OF ADMISSION:  03/03/19

 

DATE OF DISCHARGE:  03/08/19

 

DISCHARGE DIAGNOSES:

1.  Right hip fracture.

2.  Right total knee replacement, remote.

3.  COPD.

4.  Hypertension.

5.  Hyperlipidemia.

 

HISTORY OF PRESENT ILLNESS AND HOSPITAL COURSE:  For complete history of the 
events leading up to her rehab stay, please see the history and physical 
dictated by Dr. Mari Glez on 03/03/19.  While on the rehab unit, the 
patient continued to have a mild chronic cough, which she has had for over a 
year.  It has been worked up by her primary care doctor and she had an 
appointment to see Dr. Mckeon, but was not able to make it because of her hip.  
She had slightly low blood pressures on admission, but these normalized.  
Otherwise, she was fairly stable from a medical point of view.  She was 
maintained on Lovenox for DVT prophylaxis.  The patient otherwise was medically 
stable.  She was seen by both physical and occupational therapy and made good 
gains with both disciplines.  With physical therapy at the time of admission, 
the patient required min assist to do a transfer.  She was contact guard to 
ambulate with occupational therapy at the time of admission.  She was 
supervision for upper body dressing, max assist for lower body dressing, mod 
assist for bathing, mod assist for toilet transfers.  By the time of discharge, 
the patient was independent with transfers, independent ambulating 150 feet, 
independent going up and down 5 steps, independent with dressing, independent 
with toileting and toilet transfers.  The patient was discharged home on 03/08/
19.

 

DISCHARGE DIET:  Regular.

 

DISCHARGE MEDICATIONS:

1.  Vitamin C 250 mg daily.

2.  Tessalon Perles 100 mg three times a day as needed.

3.  Claritin-D 120 mg twice daily.

4.  Lovenox 40 mg daily for 15 days.

5.  Flonase 2 sprays to both nares twice daily.

6.  Mucinex 600 mg twice daily.

7.  Tramadol 50 mg every 6 hours as needed.

8.  Anoro 62.5/25 Ellipta 1 inhalation daily.

9.  Zocor 20 mg daily.

 

SERVICES AFTER DISCHARGE:  The patient will have outpatient physical therapy at 
[facility].

 

FOLLOWUP:  She will follow up with her primary care doctor, Dr. Greg Salazar 
as well as with her orthopedic surgeon, Dr. Mayur Velasquez.

 

 [id number]/[facility] #: [id number]

JAYDEN

## 2019-10-15 ENCOUNTER — HOSPITAL ENCOUNTER (INPATIENT)
Dept: HOSPITAL 25 - MEDTELE | Age: 74
LOS: 2 days | Discharge: ANOTHER HOSPITAL | DRG: 292 | End: 2019-10-17
Attending: HOSPITALIST | Admitting: HOSPITALIST
Payer: MEDICARE

## 2019-10-15 DIAGNOSIS — I11.0: Primary | ICD-10-CM

## 2019-10-15 DIAGNOSIS — Z88.8: ICD-10-CM

## 2019-10-15 DIAGNOSIS — Z79.82: ICD-10-CM

## 2019-10-15 DIAGNOSIS — I08.3: ICD-10-CM

## 2019-10-15 DIAGNOSIS — I49.3: ICD-10-CM

## 2019-10-15 DIAGNOSIS — E78.5: ICD-10-CM

## 2019-10-15 DIAGNOSIS — M19.90: ICD-10-CM

## 2019-10-15 DIAGNOSIS — R74.0: ICD-10-CM

## 2019-10-15 DIAGNOSIS — I42.9: ICD-10-CM

## 2019-10-15 DIAGNOSIS — Z79.01: ICD-10-CM

## 2019-10-15 DIAGNOSIS — Z82.3: ICD-10-CM

## 2019-10-15 DIAGNOSIS — Z80.42: ICD-10-CM

## 2019-10-15 DIAGNOSIS — E55.9: ICD-10-CM

## 2019-10-15 DIAGNOSIS — J44.9: ICD-10-CM

## 2019-10-15 DIAGNOSIS — I27.20: ICD-10-CM

## 2019-10-15 DIAGNOSIS — I95.9: ICD-10-CM

## 2019-10-15 DIAGNOSIS — R79.89: ICD-10-CM

## 2019-10-15 DIAGNOSIS — J30.2: ICD-10-CM

## 2019-10-15 DIAGNOSIS — I71.2: ICD-10-CM

## 2019-10-15 DIAGNOSIS — I47.2: ICD-10-CM

## 2019-10-15 DIAGNOSIS — Z23: ICD-10-CM

## 2019-10-15 DIAGNOSIS — I50.23: ICD-10-CM

## 2019-10-15 LAB
ALBUMIN SERPL BCG-MCNC: 3 G/DL (ref 3.2–5.2)
ALBUMIN/GLOB SERPL: 1 {RATIO} (ref 1–3)
ALP SERPL-CCNC: 125 U/L (ref 34–104)
ALT SERPL W P-5'-P-CCNC: 89 U/L (ref 7–52)
ANION GAP SERPL CALC-SCNC: 6 MMOL/L (ref 2–11)
APTT PPP: 34.5 SECONDS (ref 26–38)
AST SERPL-CCNC: 42 U/L (ref 13–39)
BASOPHILS # BLD AUTO: 0.1 10^3/UL (ref 0–0.2)
BUN SERPL-MCNC: 14 MG/DL (ref 6–24)
BUN/CREAT SERPL: 22.2 (ref 8–20)
CALCIUM SERPL-MCNC: 8.3 MG/DL (ref 8.6–10.3)
CHLORIDE SERPL-SCNC: 100 MMOL/L (ref 101–111)
EOSINOPHIL # BLD AUTO: 0.3 10^3/UL (ref 0–0.6)
GLOBULIN SER CALC-MCNC: 2.9 G/DL (ref 2–4)
GLUCOSE SERPL-MCNC: 107 MG/DL (ref 70–100)
HCO3 SERPL-SCNC: 30 MMOL/L (ref 22–32)
HCT VFR BLD AUTO: 41 % (ref 35–47)
HGB BLD-MCNC: 13.4 G/DL (ref 12–16)
INR PPP/BLD: 1.39 (ref 0.82–1.09)
LYMPHOCYTES # BLD AUTO: 1.5 10^3/UL (ref 1–4.8)
MAGNESIUM SERPL-MCNC: 1.8 MG/DL (ref 1.9–2.7)
MCH RBC QN AUTO: 31 PG (ref 27–31)
MCHC RBC AUTO-ENTMCNC: 32 G/DL (ref 31–36)
MCV RBC AUTO: 95 FL (ref 80–97)
MONOCYTES # BLD AUTO: 0.7 10^3/UL (ref 0–0.8)
NEUTROPHILS # BLD AUTO: 6.2 10^3/UL (ref 1.5–7.7)
NRBC # BLD AUTO: 0 10^3/UL
NRBC BLD QL AUTO: 0.1
PLATELET # BLD AUTO: 231 10^3/UL (ref 150–450)
POTASSIUM SERPL-SCNC: 4.2 MMOL/L (ref 3.5–5)
PROT SERPL-MCNC: 5.9 G/DL (ref 6.4–8.9)
RBC # BLD AUTO: 4.34 10^6 /UL (ref 3.7–4.87)
SODIUM SERPL-SCNC: 136 MMOL/L (ref 135–145)
TROPONIN I SERPL-MCNC: 0.02 NG/ML (ref ?–0.04)
WBC # BLD AUTO: 8.8 10^3/UL (ref 3.5–10.8)

## 2019-10-15 PROCEDURE — 90732 PPSV23 VACC 2 YRS+ SUBQ/IM: CPT

## 2019-10-15 PROCEDURE — 94640 AIRWAY INHALATION TREATMENT: CPT

## 2019-10-15 PROCEDURE — 84439 ASSAY OF FREE THYROXINE: CPT

## 2019-10-15 PROCEDURE — 85025 COMPLETE CBC W/AUTO DIFF WBC: CPT

## 2019-10-15 PROCEDURE — 80048 BASIC METABOLIC PNL TOTAL CA: CPT

## 2019-10-15 PROCEDURE — 80053 COMPREHEN METABOLIC PANEL: CPT

## 2019-10-15 PROCEDURE — 82746 ASSAY OF FOLIC ACID SERUM: CPT

## 2019-10-15 PROCEDURE — 84443 ASSAY THYROID STIM HORMONE: CPT

## 2019-10-15 PROCEDURE — 85610 PROTHROMBIN TIME: CPT

## 2019-10-15 PROCEDURE — 90686 IIV4 VACC NO PRSV 0.5 ML IM: CPT

## 2019-10-15 PROCEDURE — 93005 ELECTROCARDIOGRAM TRACING: CPT

## 2019-10-15 PROCEDURE — 36415 COLL VENOUS BLD VENIPUNCTURE: CPT

## 2019-10-15 PROCEDURE — 83880 ASSAY OF NATRIURETIC PEPTIDE: CPT

## 2019-10-15 PROCEDURE — 84484 ASSAY OF TROPONIN QUANT: CPT

## 2019-10-15 PROCEDURE — 85730 THROMBOPLASTIN TIME PARTIAL: CPT

## 2019-10-15 PROCEDURE — 84481 FREE ASSAY (FT-3): CPT

## 2019-10-15 PROCEDURE — 83735 ASSAY OF MAGNESIUM: CPT

## 2019-10-15 PROCEDURE — 82607 VITAMIN B-12: CPT

## 2019-10-15 RX ADMIN — ATORVASTATIN CALCIUM SCH MG: 10 TABLET, FILM COATED ORAL at 22:08

## 2019-10-15 NOTE — HP
HISTORY AND PHYSICAL:

 

DATE OF ADMISSION:  10/15/19

 

PRIMARY CARE PROVIDER:  Dr. Greg Salazar.

 

ADMITTING PROVIDER:  Nish Rivera MD

 

CHIEF COMPLAINT:  Shortness of breath, lower extremity edema, need for 
cardiology consultation(transferred from [place] with acute severe systolic CHF
(new onset)).

 

HISTORY OF PRESENT ILLNESS:  Soha Sanchez is a 73-year-old female with PMH 
of diet-controlled hypertension, hyperlipidemia, seasonal allergies.  She 
presented to [facility] 4 days ago on Friday, 10/11/19 with worsening 
shortness of breath, dyspnea on exertion.  She had new nausea and vomiting both 
that morning and at dinner time and prompted her to seek evaluation.  Her feet 
had been swollen for several months and seems to have been progressive up her 
legs for at least 2 or 3 days prior to that admission.  She was requiring 4 L 
of oxygen on presentation and had an elevated BNP of 2340 and a chest x-ray 
suggestive of mild CHF.  There was suspicion of acute CHF of new onset and she 
was started on low-dose 3.125mg Coreg and Aldactone 25 mg p.o. b.i.d. and given 
Lasix 40 mg IV.  Her blood pressures dipped down into the 90s systolically for 
the most of the next few days and she got an echocardiogram on 10/14/19, which 
showed an EF of less than 20% with severe global hypokinesis and some grade 1 
diastolic dysfunction and moderate-to-severe pulmonary hypertension with RVSP 
of 56 mmHg, mild-to-moderate aortic valve regurgitation, mild-to-moderate 
mitral valve regurgitation, moderate tricuspid regurgitation.  She has also had 
a CT chest noncontrast on 10/13/19, which showed some pulmonary edema and some 
trace pericardial effusion.  She was unable to get further diuresis given the 
low blood pressures. The case was discussed with Cardiologist Dr. Tian given 
her failure to improve and low blood pressures and concern for need for expert 
cardiology opinion, which was not available at [place], so she was transferred 
[place] [facility] [place].  She today is feeling like she is still short of 
breath with exertion.  She is on 2 L of oxygen.  She denies any fevers, chills, 
night sweats, headaches, diarrhea, constipation.  She did have gain in weight 
from 156lb at home a week prior to 165lb on admission to [place].  She denies 
any chest pain, jaw pain, neck pain.  She does have some orthopneas of 2 
pillows for at least 3 to 4 months.  She is a fairly poor historian in terms of 
time course.  She has been seen by Dr. Pina and an allergist for her chronic 
cough and dyspnea on exertion.  She also had an elevated troponin of 0.07 on 10/
12/19.

 

PAST MEDICAL HISTORY:  Hyperlipidemia, vitamin D deficiency, seasonal allergies
, diet-controlled hypertension.

 

MEDICATIONS:  Include:

1.  Fluticasone 50 mcg both nares daily.

2.  Calcium carbonate 600 mg p.o. daily.

3.  Breo-Ellipta 1 puff inhaled daily.

4.  Theragran multivitamin 1 tab p.o. daily.

5.  Ascorbic acid 250 mg p.o. daily.

6.  Aspirin 81 mg daily.

7.  Levocetirizine (Xyzal) 1 tab p.o. q.p.m.

8.  Simvastatin 20 mg p.o. q.p.m.

9.  Mometasone (Nasonex) 2 sprays both nares daily.

10.  Ergocalciferol 5000 units p.o. daily.

 

PAST SURGICAL HISTORY:  Tonsillectomy, right hip ORIF in 2019, right 
knee surgery.

 

ALLERGIES:  LASIX (rash).

 

FAMILY HISTORY:  Father  at age 85, he had prostate cancer.  Mother  at 
age 96 of old age.  Sister is alive at age 87 and has COPD.  Brother Lennox is 
relatively healthy living locally.  Brother Loki lives in [place], he had 
a severe hemorrhagic CVA.

 

SOCIAL HISTORY:  The patient is a never smoker, but was around a lot of second 
hand smoke and wood stoves (she previously owned a bar/restaurant).  She has 
never been a drinker herself.  No drug use.  Medical surrogate is her daughter-
in-law, Estephania Sanchez. She desires to be a full code.

 

REVIEW OF SYSTEMS:  A complete 14-point review of systems is negative except as 
per HPI.

 

                               PHYSICAL EXAMINATION

 

GENERAL APPEARANCE:  In no acute distress.

 

VITAL SIGNS:  Blood pressure 98/74, heart rate 114, satting well on 2 L, heart 
rate on the telemetry currently the high 90s, respiratory rate 16. Temp 97.3

 

HEENT:  Normocephalic, atraumatic.  Pupils equal, round, and reactive to light. 
Extraocular motions are intact.  Nose clear.

 

NECK:  Supple.

 

LUNGS:  With diffuse crackles, worse at the bases, better as you ascend.  No 
rhonchi or wheezing.

 

CARDIOVASCULAR:  Regular rate and rhythm.  No murmurs, rubs, or gallops.

 

ABDOMEN:  Soft, nontender, nondistended.  No rebound.  No guarding.

 

EXTREMITIES:  Warm, well perfused.  There is 2+ pitting edema in b/l the legs 
with 1+ into the dependent buttocks.

 

NEUROLOGIC:  Cranial nerves II through XII intact.   strength intact.

 

SKIN:  No lesions or rashes.

 

 DIAGNOSTIC STUDIES/LAB DATA:  Labs pending.  EKG pending.

 

ASSESSMENT AND PLAN:  Soha Sanchez is a 73-year-old female with new onset of 
severe systolic heart failure in the setting of dyspnea on exertion and 
increasing lower extremity edema over the course of weeks to months, EF was 
less than 20% with severe global hypokinesis on echo yesterday.  Diuresis has 
been limited by low blood pressures in the 90s.  Cardiology will be consulted 
in the morning to help manage.  I am getting basic admission labs, CBC, CMP, 
troponin, INR (which was notably elevated on admission at [place] at 1.51), PTT
, magnesium.  Strict I's and O's, daily weights.  Await on admission labs and 
blood pressure trend before deciding on diuresis.  She could potentially trial 
a Lasix drip, but that might require transfer to intensive care unit.  For the 
rest of her other problems, continue her inhalers and allergy medications like 
levocetirizine, Breo, Flonase.  For hyperlipidemia, continue simvastatin and 
aspirin, continue at 81 mg.  I will put her on heparin versus Lovenox DVT 
prophylaxis (await on renal function on admission labs).  It was noted her her 
most recent creatinine was 0.8, so likely can get by with Lovenox.  I will put 
her on heart healthy diet.  No caffeine.  She wants to be a full code. 
Inpatient status. Medical surrogate is her daughter-in-law, Estephania Sanchez.

 

 

 

[id number]/[facility] #: [id number]

JAYDEN

## 2019-10-15 NOTE — XMS REPORT
Continuity of Care Document (CCD)

 Created on:2019



Patient:Soha Sanchez

Sex:Female

:1945

External Reference #:[id number]





Demographics







 Address  [address] [address]

 

 Home Phone  [phone]

 

 Email Address  [email or web address]

 

 Preferred Language  en

 

 Marital Status  Not  or 

 

 Nondenominational Affiliation  Unknown

 

 Race  White

 

 Ethnic Group  Not  or 









Author







 Name  Bobo Hunter M.D. (transmitted by agent of provider Lynne Littlejohn)

 

 Address  [address] [place] [id number]









Care Team Providers







 Name  Role  Phone

 

 Greg Salazar D.O. - Internal  Care Team Information   [phone]
[phone]



 Medicine    









Problems







 Active Problems  Provider  Date

 

 Localized, primary osteoarthritis  Jeronimo Haley M.D.  Onset: 2015

 

 Traumatic arthropathy-knee  Jeronimo Haley M.D.  Onset: 2015

 

 Acquired genu varum  Jeronimo Haley M.D.  Onset: 2015

 

 Arthroplasty of knee  Johnny Gautam M.D.  Onset: 2016

 

 Knee stiff  Johnny Gautam M.D.  Onset: 2016

 

 Otalgia  Bobo Hunter M.D.  Onset: 2018

 

 Impacted cerumezoie Hunter M.D.  Onset: 2019

 

 Other specified disorders of Eustachian  Bobo Hunter M.D.  Onset: 



 tube, bilateral    







Social History







 Type  Date  Description  Comments

 

 Birth Sex    Unknown  

 

 Tobacco Use  Start: Unknown  Never Smoked Cigarettes  

 

 Tobacco Use  Start: Unknown  Secondhand smoke  Father smoked



       cigars

 

 Smoking Status  Reviewed: 19  Secondhand smoke  Father smoked



       cigars

 

 ETOH Use    Denies alcohol use  

 

 Tobacco Use  Start: Unknown  Patient has never  



     smoked  

 

 Recreational Drug Use    Denies Drug Use  

 

 Exercise Type/Frequency    Exercises regularly  

 

 Exercise Type/Frequency    Walks daily  Inside house







Allergies, Adverse Reactions, Alerts







 Active Allergies  Reaction  Severity  Comments  Date

 

 Latex  Contact dermatitis, Urticaria      2015







Medications







 Active Medications  SIG  Qnty  Indications  Ordering  Date



         Provider  

 

 Proair HFA  2 puffs every 4      Unknown  



       108(90Base)  hours as needed        



 mcg/Act Aerosol          



           

 

 Dymista  1 puff each      Unknown  



    137-50mcg/Act  nostril two times        



 Suspension  per day (not        



   received yet        



   6/10/19)        

 

 Vitamin D3 Ultra Potency  one by mouth one      Unknown  



   time per month        



 [id number] Tablets          



           

 

 OS-Abimael 500-600 MG-Unit  1 by mouth one      Unknown  



   time per day        

 

 Delsym  As needed      Unknown  



   30mg/5ML Suer          



           

 

 Breo Ellipta  Take 1 puff By      Unknown  



         100-25mcg/Inh  Mouth Every Day        



 Aerosol          

 

 Levocetirizine  Take 1 Tablet By      Unknown  



 Dihydrochloride  Mouth Every Day        



            5mg Tablets  as Needed        



           

 

 Tylenol Extra Strength  2 tabs by mouth      Unknown  



                   500mg  every 6 hours as        



 Tablets  needed        

 

 Tramadol HCL  Take 1 Tablet By      Unknown  



         50mg Tablets  Mouth Every 6        



   Hours as Needed        



   For Pain        

 

 Mucinex  1 by mouth two      Unknown  



    600mg Tablets ER 12HR  times per day        



           

 

 Cyclobenzaprine HCL  1 tablet by mouth      Unknown  



                10mg  q8 hours as        



 Tablets  needed muscle        



   spasms        

 

 Simvastatin  1 tab at bedtime      Unknown  



        20mg Tablets          



           

 

 Metrocream  prn      Unknown  



       0.75% Cream          



           

 

 Vitamin C  1/2 by mouth      Unknown  



      500mg Tablets  every day        



           

 

 Aspirin  (Aspirin 325 mg      Unknown  



    81mg Chewtabs  for 3 weeks        



   started 3/21/19).        



   1 by mouth every        



   day        

 

 Multi Complete  daily      Unknown  



            Capsules          



           









 History Medications









 Breo Ellipta  1 puff by mouth  60units  R05  Marty Pina MD  06/10/2019 -



          100-25mcg/Inh  every day        07/10/2019



 Aerosol          



           

 

 Breo Ellipta  1 puff by mouth  60units  R05  Marty Pina MD  2019 -



          100-25mcg/Inh  every day        2019



 Aerosol          



           







Immunizations







 Description

 

 No Information Available







Vital Signs







 Date  Vital  Result  Comment

 

 2019  1:58pm  Height  61.5 inches  5'1.50"









 Weight  160.00 lb  

 

 Heart Rate  120 /min  

 

 BP Systolic Sitting  112 mmHg  

 

 BP Diastolic Sitting  80 mmHg  

 

 Body Temperature  97.3 F  

 

 BMI (Body Mass Index)  29.7 kg/m2  









 06/10/2019  9:47am  Height  61.5 inches  5'1.50"









 Weight  151.38 lb  

 

 Heart Rate  118 /min  

 

 BP Systolic Sitting  116 mmHg  Lue large cuff

 

 BP Diastolic Sitting  86 mmHg  Lue large cuff

 

 Respiratory Rate  20 /min  

 

 O2 % BldC Oximetry  94 %  On Ra

 

 BMI (Body Mass Index)  28.1 kg/m2  







Results







 Test  Date  Facility  Test  Result  H/L  Range  Note

 

 CBC Auto  2019  [facility]  White Blood  6.3 10^3/uL  Normal  
3.5-10.8  



 Diff    [address]  Count        



     [place] [address]          



     [phone]          









 Red Blood Count  4.39 10^6/uL  Normal  3.70-4.87  

 

 Hemoglobin  13.6 g/dL  Normal  12.0-16.0  

 

 Hematocrit  42 %  High  33-41  

 

 Mean Corpuscular Volume  95 fL  Normal  80-97  

 

 Mean Corpuscular Hemoglobin  31 pg  Normal  27-31  

 

 Mean Corpuscular HGB Conc  32 g/dL  Normal  31-36  

 

 Red Cell Distribution Width  15 %  Normal  10.5-15  

 

 Platelet Count  186 10^3/uL  Normal  150-450  

 

 Mean Platelet Volume  9.0 fL  Normal  7.4-10.4  

 

 Abs Neutrophils  4.0 10^3/uL  Normal  1.5-7.7  

 

 Abs Lymphocytes  1.4 10^3/uL  Normal  1.0-4.8  

 

 Abs Monocytes  0.5 10^3/uL  Normal  0-0.8  

 

 Abs Eosinophils  0.3 10^3/uL  Normal  0-0.6  

 

 Abs Basophils  0 10^3/uL  Normal  0-0.2  

 

 Abs Nucleated RBC  0 10^3/uL      

 

 Granulocyte %  64.1 %      

 

 Lymphocyte %  22.5 %      

 

 Monocyte %  8.5 %      

 

 Eosinophil %  4.4 %      

 

 Basophil %  0.5 %      

 

 Nucleated Red Blood Cells %  0      









 Laboratory test  2019  [facility]  B-Type  382 pg/mL  High  <=
100  



 finding    [address]  Natriuretic        



     [place] [address]  Peptide BNP        



     [phone]          









 Immunoglobulin E (Ige)  11.4 kU/L    <= 214  1









 1  Test Performed by:



   [facility]



   [address] [place] [address]







Procedures







 Date  Code  Description  Status

 

 2019  09763  Remove Impacted Cerumen  Completed

 

 2019  22736  Diffusing Capacity  Completed

 

 2019  62439  Plethysmography Determination Lung Volumes & Per Airway  
Completed



     Resist  

 

 2019  49585  Pulmonary Function><Bronchodil  Completed

 

 2019  78122  EKG, Interpretation Only  Completed







[employer]







 Description

 

 No Information Available







Encounters







 Type  Date  Location  Provider  Dx  Diagnosis

 

 Office Visit  06/10/2019  [facility]  Marty Pina  R05  Cough



   10:15a  [facility]      

 

 Office Visit  2019  [facility]  ANDRES Garcia  Cough



    9:15a  [facility]      

 

 [facility]  S72.141A  Displaced



    9:46a  yeni Guerrero MD    intertrochanteric



     [facility]ospital[facility]      fracture of right



           femur, init









 I95.1  Orthostatic hypotension

 

 R35.8  Other polyuria









 [facility]  [facility]



 Visit  9:46a  yeni Guerrero MD    fracture of right



           femur, init









 I95.1  Orthostatic hypotension

 

 E78.5  Hyperlipidemia, unspecified

 

 J44.9  Chronic obstructive pulmonary disease, unspecified









 [facility]  S[facility]  Displaced



 Visit  9:45a  yeni Guerrero    intertrochanteric



     Mercy Dalal MD    fracture of right



           femur, init









 I95.1  Orthostatic hypotension

 

 J44.9  Chronic obstructive pulmonary disease, unspecified

 

 E78.5  Hyperlipidemia, unspecified







Assessments







 Date  Code  Description  Provider

 

 2019  H90.5  Unspecified sensorineural hearing  Bobo Hunter M.D.



     loss  

 

 2019  H61.23  Impacted cerumen, bilateral  Bobo Hunter M.D.

 

 06/10/2019  R05  Cough  Marty Pina MD

 

 2019  R05  Hardeep Mckeon MD

 

 2019  S72.141D  Displaced intertrochanteric fracture  Mayur Velasquez MD



     of right femur, subsequ  

 

 2019  R05  Hardeep Pina MD

 

 2019  S72.141D  Displaced intertrochanteric fracture  Mayur Velasquez MD



     of right femur, subsequ  

 

 2019  S72.141A  Displaced intertrochanteric fracture  Tony Dalal MD



     of right femur, init  

 

 2019  I95.1  Orthostatic hypotension  Tony Dalal MD

 

 2019  R35.8  Other polyuria  Tony Dalal MD

 

 2019  R94.31  Abnormal electrocardiogram [ECG]  Qutaybeh S. Maghaydah, M.D.



     [EKG]  

 

 2019  S72.141A  Displaced intertrochanteric fracture  Don Baumann, PA-C



     of right femur, initial  

 

 2019  S72.141A  Displaced intertrochanteric fracture  Tony Dalal MD



     of right femur, init  

 

 2019  I95.1  Orthostatic hypotension  Tony Dalal MD

 

 2019  E78.5  Hyperlipidemia, unspecified  Tony Dalal MD

 

 2019  J44.9  Chronic obstructive pulmonary  Tony Dalal MD



     disease, unspecified  

 

 2019  S72.141A  Displaced intertrochanteric fracture  Luisana Mena RPA-C



     of right femur, init  

 

 2019  S72.141A  Displaced intertrochanteric fracture  Tony Dalal MD



     of right femur, init  

 

 2019  I95.1  Orthostatic hypotension  Tony Dalal MD

 

 2019  J44.9  Chronic obstructive pulmonary  Tony Dalal MD



     disease, unspecified  

 

 2019  E78.5  Hyperlipidemia, unspecified  Tony Dalal MD







Plan of Treatment

2019 - Bobo Hunter M.D.H90.5 Unspecified sensorineural hearing 
lossComments:Cerumen was removed, patient is scheduled for an audiogram.  
Recheck back after audiogram.H61.23 Impacted cerumen, bilateral



Functional Status







 Description

 

 No Information Available







Mental Status







 Description

 

 No Information Available







Referrals







 Description

 

 No Information Available

## 2019-10-16 LAB
ALBUMIN SERPL BCG-MCNC: 3 G/DL (ref 3.2–5.2)
ALBUMIN/GLOB SERPL: 1 {RATIO} (ref 1–3)
ALP SERPL-CCNC: 117 U/L (ref 34–104)
ALT SERPL W P-5'-P-CCNC: 78 U/L (ref 7–52)
ANION GAP SERPL CALC-SCNC: 3 MMOL/L (ref 2–11)
AST SERPL-CCNC: 40 U/L (ref 13–39)
BUN SERPL-MCNC: 11 MG/DL (ref 6–24)
BUN/CREAT SERPL: 16.4 (ref 8–20)
CALCIUM SERPL-MCNC: 8.4 MG/DL (ref 8.6–10.3)
CHLORIDE SERPL-SCNC: 101 MMOL/L (ref 101–111)
GLOBULIN SER CALC-MCNC: 3 G/DL (ref 2–4)
GLUCOSE SERPL-MCNC: 115 MG/DL (ref 70–100)
HCO3 SERPL-SCNC: 33 MMOL/L (ref 22–32)
MAGNESIUM SERPL-MCNC: 2.3 MG/DL (ref 1.9–2.7)
POTASSIUM SERPL-SCNC: 4.2 MMOL/L (ref 3.5–5)
PROT SERPL-MCNC: 6 G/DL (ref 6.4–8.9)
SODIUM SERPL-SCNC: 137 MMOL/L (ref 135–145)
T3FREE SERPL-MCNC: 3.1 PG/ML (ref 2.5–3.9)
TSH SERPL-ACNC: 4.76 MCIU/ML (ref 0.34–5.6)

## 2019-10-16 RX ADMIN — MOMETASONE FUROATE AND FORMOTEROL FUMARATE DIHYDRATE SCH: 100; 5 AEROSOL RESPIRATORY (INHALATION) at 08:34

## 2019-10-16 RX ADMIN — ATORVASTATIN CALCIUM SCH MG: 10 TABLET, FILM COATED ORAL at 17:25

## 2019-10-16 RX ADMIN — FLUTICASONE PROPIONATE SCH SPRAY: 50 SPRAY, METERED NASAL at 08:23

## 2019-10-16 RX ADMIN — FUROSEMIDE SCH MG: 10 INJECTION, SOLUTION INTRAMUSCULAR; INTRAVENOUS at 21:31

## 2019-10-16 RX ADMIN — ASPIRIN SCH MG: 81 TABLET, CHEWABLE ORAL at 08:23

## 2019-10-16 RX ADMIN — MOMETASONE FUROATE AND FORMOTEROL FUMARATE DIHYDRATE SCH PUFF: 100; 5 AEROSOL RESPIRATORY (INHALATION) at 19:13

## 2019-10-16 RX ADMIN — THERA TABS SCH TAB: TAB at 08:23

## 2019-10-16 RX ADMIN — ENOXAPARIN SODIUM SCH MG: 40 INJECTION SUBCUTANEOUS at 08:24

## 2019-10-16 NOTE — PN
Subjective


Date of Service: 10/16/19


Interval History: 





Patient seen and examined. Complaints of SOB with minimal exertion, mild 

unproductive cough. Denies chest pain,no fevers or chills. No further 

complaints.





Objective


Active Medications: 








Acetaminophen (Tylenol Tab*)  650 mg PO Q6H PRN


   PRN Reason: MILD PAIN OR FEVER > 100.4


   Last Admin: 10/16/19 12:01 Dose:  650 mg


Aspirin (Aspirin 81 Mg Chew Tab*)  81 mg PO DAILY [facility]


   Last Admin: 10/16/19 08:23 Dose:  81 mg


Atorvastatin Calcium (Lipitor*)  10 mg PO QPM [facility]


   Last Admin: 10/15/19 22:08 Dose:  10 mg


Cetirizine HCl (Zyrtec*)  10 mg PO QPM [facility]


Enoxaparin Sodium (Lovenox(*))  40 mg SUBCUT Q24H [facility]


   Last Admin: 10/16/19 08:24 Dose:  40 mg


Fluticasone Propionate (Flonase Nasal Spray 50mcg*)  2 spray BOTH NARES DAILY 

[facility]


   Last Admin: 10/16/19 08:23 Dose:  2 spray


Furosemide (Lasix Iv*)  20 mg IV BID [facility]


Mometasone Furoate/Formoterol Fumar (Dulera 100/5 Mdi*)  2 puff INH BID [facility]


   Last Admin: 10/16/19 08:34 Dose:  Not Given


Multivitamins/Minerals (Theragran/Minerals Tab*)  1 tab PO DAILY [facility]


   Last Admin: 10/16/19 08:23 Dose:  1 tab








 Vital Signs - 8 hr











  10/16/19 10/16/19 10/16/19





  11:05 12:48 15:15


 


Temperature  96.8 F 97.6 F


 


Pulse Rate 105 95 87


 


Respiratory  16 20





Rate   


 


Blood Pressure 114/68 91/66 88/61





(mmHg)   


 


O2 Sat by Pulse  96 100





Oximetry   











Oxygen Devices in Use Now: Nasal Cannula


Appearance: alert, NAD


Eyes: PERRLA


Ears/Nose/Mouth/Throat: Mucous Membranes Moist


Neck: NL Appearance and Movements; NL JVP, Trachea Midline


Respiratory: Symmetrical Chest Expansion and Respiratory Effort, Clear to 

Auscultation


Cardiovascular: - - irregular, tachy


Abdominal: NL Sounds; No Tenderness; No Distention


Extremities: - - generalized edema


Skin: No Rash or Ulcers


Neurological: Alert and Oriented x 3


Nutrition: Taking PO's


Result Diagrams: 


 10/15/19 20:35





 10/16/19 09:25





Assess/Plan/Problems-Billing


Assessment: 





This is a 73 year old female patient that was direct admitted from [facility] with new onset acutely decompensated heart failure.





- Patient Problems


(1) Acute decompensated heart failure


Code(s): I50.9 - HEART FAILURE, UNSPECIFIED   SNOMED Code(s): 44615406


   Comment: 


 - Systolic HF, new, with EF of 20% with anasarca


 - Per RN, had one run of self-limited VT, asymptomatic


 - Diurese per cardiology, currently trialing lasix, must monitor BP for 

hypotension


 - Will need [facility], she is high risk, will defer to cardiology if she will need 

transfer to [place] for advanced HF service


 - Continue supplemental O2, daily weights, low Na diet, I&Os and ASA   





(2) HTN (hypertension)


Code(s): I10 - ESSENTIAL (PRIMARY) HYPERTENSION   SNOMED Code(s): 07752401


   Comment: 


 - Currently hypotensive, monitor while diuresing   





(3) HLD (hyperlipidemia)


Code(s): E78.5 - HYPERLIPIDEMIA, UNSPECIFIED   SNOMED Code(s): 32398094


   Comment: 


 - Continue statin   





(4) DVT prophylaxis


Code(s): OLM9137 -    SNOMED Code(s): 539941540


   Comment: 


 - Lovenox SQ   





(5) Full code status


Code(s): Z78.9 - OTHER SPECIFIED HEALTH STATUS   SNOMED Code(s): 774974800


   


Status and Disposition: 





Inpatient, dispo TBD, appreciate input from cardiology.

## 2019-10-16 NOTE — CONS
CC:  Dr. Grazyna Leal; Dr. Greg Salazar *

 

CONSULTATION REPORT:

 

DATE OF CONSULT:  10/16/19

 

ATTENDING PHYSICIAN:  Dr. Grazyna Leal.* (DICTATED BY MAYLIN HUDDLESTON NP)

 

PRIMARY PHYSICIAN:  Dr. Greg Salazar.

 

CHIEF COMPLAINT:  Pallor, nausea, vomiting, shortness of breath, weight gain, 
edema.

 

HISTORY OF PRESENT ILLNESS:  This is a 73-year-old female patient with history 
of hyperlipidemia; hypertension, managed through diet; reported COPD, who 
presented to [facility] on 10/11/19 due to increased shortness of breath
, dyspnea on exertion, nausea, vomiting, pallor.  While being evaluated at 
[facility], initial troponin was 0.09 and trended down to 0.07.  BNP was 
2340.  She required oxygen due to hypoxia. Chest x-ray according to medical 
records was suggestive of mild congestive heart failure.  She was started on low
-dose Coreg and Aldactone therapy.  She became hypotensive, thus medication 
management was difficult. Echocardiogram was updated on 10/14/19; per report, 
LVEF less than 20% with severe decrease in global wall motion, grade 1 
diastolic dysfunction, severe global hypokinesis, moderate left atrial 
dilatation, mild-to-moderate aortic insufficiency, mild-to-moderate mitral 
regurgitation, moderate tricuspid regurgitation with moderate-to-severe 
pulmonary hypertension, left ventricular end diastole was 6.5 cm.  She was 
transferred to [facility] on 10/15/19 for high level of care.  At 
this current time, she reports shortness of breath, feeling of abdominal 
distention, and edema.  She denies ever having chest pain.  She denies dizziness
, syncope, or palpitations.  Retrospectively, she adds that she has been having 
progressive dyspnea on exertion since at least July, although symptoms have 
escalated in the past 2 to 3 weeks.  She denies any recent illness, infection, 
or diarrhea.  Apparently, she saw Dr. Salazar at the end of September and no 
medication changes or diagnostic studies were ordered at that time.

 

Last ischemic evaluation none.

 

Last echocardiogram as mentioned 10/14/19, I did speak to Dr. Salazar' office to 
confirm that she has never had an echocardiogram prior to that.

 

PAST MEDICAL HISTORY:

1.  Reported hypertension, on medical therapy.

2.  Hyperlipidemia.

3.  Orthostatic hypotension postsurgery in 2019.

4.  Vitamin D deficiency.

5.  Arthritis.

6.  Reported COPD.

7.  Newly diagnosed systolic dysfunction.

8.  A 3.8 ascending aortic aneurysm.

9.  Moderate-to-severe pulmonary hypertension.

10.  Mild-to-moderate tricuspid regurgitation.

11.  Mild-to-moderate mitral insufficiency.

 

PAST SURGICAL HISTORY:  Includes:

1.  Tonsillectomy.

2.  Right hip ORIF in 2019.

3.  Right knee surgery in 2016.

 

ALLERGIES:  Reported LASIX allergy, which causes rash.  Denies allergy to 
contrast dye or shellfish or aspirin.

 

FAMILY HISTORY:  Father  at the age of 85, he had prostate cancer.  Mother 
 at age 96 due to old age.  Sister is alive at age 87 and has COPD.  Brother
, Lennox, is in relatively good health, he lives locally.  Her brother, Loki, 
lives in [place] and has a history of hemorrhagic stroke.

 

SOCIAL HISTORY:  The patient is , lives at home alone.  She previously 
worked at a [employer], retired a year ago.  Denies 
tobacco use, drug use, or alcohol use.  She is a full code.

 

REVIEW OF SYSTEMS:  All systems have been reviewed and otherwise negative 
except as above mentioned in the HPI.

 

PHYSICAL EXAMINATION:  Temperature 97.3, pulse 84, respirations 20, oxygenation 
97% on 2 L nasal cannula, blood pressure 194/60.  General:  The patient is 
sitting in chair, eating breakfast upon entering room, does not appear to be in 
any apparent distress.  She is alert and oriented x3.  HEENT:  Head is 
atraumatic, normocephalic.  Oral mucosa is moist.  Tongue is in midline.  Neck:
  Trachea midline.  Positive JVD.  No carotid bruits.  Cardiac:  Diminished S1, 
S2.  Regular rate and rhythm.  I am not able to auscultate a murmur.  No rub.  
Positive gallop. Lungs: Auscultated posteriorly, inspiratory and expiratory 
rales noted in bilateral bases.  Respirations are nonlabored.  /GI:  Abdomen 
is distended, firm, nontender, normoactive bowel sounds x4.  Extremities:  3+ 
pretibial edema noted on right side, 2+ pretibial edema noted on left side.  
Trace pitting thigh edema noted.  Peripheral Vascular:  3+ dorsalis pedis pulse 
bilaterally and symmetrically.  Skin:  Intact.  No evidence of jaundice, rashes
, or ecchymosis appreciated.

 

DIAGNOSTIC STUDIES/LAB DATA:  Blood work reviewed from [facility].  
Troponin was 0.09 on 10/11/19.  BNP 2340.  INR was 1.5.  She has had 
transaminitis, which is improving.  Most recent chemistry, 10/16/16; sodium 137
, potassium 4.2, chloride 101, carbon dioxide 33, BUN is 11, creatinine 0.67, 
glucose 115. AST 40, ALT 78, alk phos 117.  BNP greater than 1300.  Albumin is 
3.0.  INR is currently 1.39.  CBC from 10/15/19, white count 8.8, hemoglobin 
13.4, hematocrit 41, platelets 231.

 

ECG obtained 10/15/19 reviewed; sinus tachycardia, rate 100 with 
intraventricular conduction delay.  She has lateral T-wave flattening.  No ST 
elevation or depression appreciated.  This is comparable to prior ECG from 
2019.

 

ASSESSMENT AND PLAN:

1.  Newly diagnosed systolic heart failure, left ventricular ejection fraction 
less than 20% with severe global hypokinesis, left ventricular end diastole 6.5 
cm.  She has associated mild-to-moderate mitral insufficiency and moderate-to-
severe pulmonary hypertension.  She is decompensated with 3+ pretibial edema 
noted ascending into her thighs with abdominal distention.  She appears to be 
having hepatic congestion related to heart failure and transaminitis has 
improved in addition to INR with diuresis.  Unfortunately, she became 
hypotensive with trial of Coreg and Aldactone therapy, thus medications were 
stopped.  In the past, according to prior medical records in , she has had 
a history of orthostatic hypotension. Her systolic blood pressure at her most 
recent office visit at Dr. Salazar in September was 130.  She states that her home 
blood pressure is usually at 100 to 130 systolic.  At this current time, I 
recommended initiating a gentle dose of IV diuresis as a trial.  We will order 
IV Lasix 20 mg and monitor blood pressure closely.  Depending upon hemodynamic 
response, she may need transfer to the ICU with pressor support for her 
hemodynamics.  She will eventually need left heart catheterization.  She will 
be at high risk given her severe LV dysfunction.  At this current time, she 
needs diuresis therapy.  We can consider speaking to the advanced heart failure 
clinic team at [facility], however, first I would like to optimize her 
volume status.  Etiology of cardiomyopathy is unclear at this time given 
symptoms have been progressive and ongoing for several months with recent 
acceleration in the past 3 weeks.  She had sudden nausea and vomiting Thursday 
evening and Friday morning, but otherwise denies any other infectious 
symptomatology.  We will weigh the patient daily strict intake, output, low 
sodium and fluid restricted diet.

2.  Hypotension.  The patient has a tendency for hypotension upon review of 
medical records.  Coreg and Aldactone on hold at this current time.  We will 
monitor closely with IV diuresis.

3.  Troponinemia.  Troponin peaked at presentation at 0.09 per [facility] medical records, likely due to decompensated systolic dysfunction.  
She will need an eventual ischemic evaluation.  She is on aspiring therapy.  
Denies ever experiencing chest pain.

4.  Transaminitis, improving with IV diuresis, likely due to above #1.

5.  Disposition:  Pending course.

 

The patient is full code.

 

Case discussed with Dr. Grazyna Leal.  He agrees with the above assessment and 
plan.

 

Thank you for this kind consultation.  We will follow the patient closely.

 

____________________________________ MAYLIN HUDDLESTON NP

 

[id number]/CPS #: [id number]

JAYDEN

## 2019-10-16 NOTE — CONSULT
Subjective


Date of Service: 10/16/19 - CC: N/V, coughing, SOB, LE edema


Interval History: 





See full consult note from NP Bhavna Sotelo.





Pt s/p diuresis today for CHF and shows some improvement in leg heaviness and 

breathing.





Pt seen with son, daughter and son in law.





Medications


Active Medications: 








Acetaminophen (Tylenol Tab*)  650 mg PO Q6H PRN


   PRN Reason: MILD PAIN OR FEVER > 100.4


   Last Admin: 10/16/19 12:01 Dose:  650 mg


Aspirin (Aspirin 81 Mg Chew Tab*)  81 mg PO DAILY [facility]


   Last Admin: 10/16/19 08:23 Dose:  81 mg


Atorvastatin Calcium (Lipitor*)  10 mg PO QPM [facility]


   Last Admin: 10/16/19 17:25 Dose:  10 mg


Cetirizine HCl (Zyrtec*)  10 mg PO QPM [facility]


   Last Admin: 10/16/19 17:25 Dose:  10 mg


Enoxaparin Sodium (Lovenox(*))  40 mg SUBCUT Q24H [facility]


   Last Admin: 10/16/19 08:24 Dose:  40 mg


Fluticasone Propionate (Flonase Nasal Spray 50mcg*)  2 spray BOTH NARES DAILY 

[facility]


   Last Admin: 10/16/19 08:23 Dose:  2 spray


Furosemide (Lasix Iv*)  20 mg IV BID [facility]


Mometasone Furoate/Formoterol Fumar (Dulera 100/5 Mdi*)  2 puff INH BID [facility]


   Last Admin: 10/16/19 08:34 Dose:  Not Given


Multivitamins/Minerals (Theragran/Minerals Tab*)  1 tab PO DAILY [facility]


   Last Admin: 10/16/19 08:23 Dose:  1 tab





Home Medications: 


 





Simvastatin TAB(NF) [Zocor 20 MG (NF)] 20 mg PO QPM 01/19/16 [History Confirmed 

10/15/19]


Calcium Carbonate [Super Calcium] 600 mg PO DAILY 02/25/19 [History Confirmed 10

/15/19]


Ergocalciferol CAP* [Drisdol CAP*] 50,000 unit PO MONTHLY 02/25/19 [History 

Confirmed 10/15/19]


Mometasone NASAL (NF) [Nasonex (NF)] 2 spray BOTH NARES DAILY 02/25/19 [History 

Confirmed 10/15/19]


Multivitamins/Minerals TAB* [Theragran/minerals TAB*] 1 tab PO DAILY 02/25/19 [

History Confirmed 10/15/19]


Ascorbic Acid TAB* [Vitamin C  TAB*] 250 mg PO DAILY  tab 03/08/19 [Rx 

Confirmed 10/15/19]


Fluticasone NASAL SPRAY 50MCG* [Flonase NASAL SPRAY 50MCG*] 2 spray BOTH NARES 

BID  btl 03/08/19 [Rx Confirmed 10/15/19]


Aspirin 81 mg CHEW TAB* [Aspirin Low Dose TAB*] 81 mg PO DAILY 10/15/19 [

History Confirmed 10/15/19]


Fluticasone Propionate [Allergy Relief] 50 mcg BOTH NARES DAILY 10/15/19 [

History Confirmed 10/15/19]


Fluticasone/Vilanterol MDI(NF) [Breo Ellipta /25(NF)] 1 puff INH DAILY 10

/15/19 [History Confirmed 10/15/19]


LevoCETirizine TAB (NF) [Xyzal TAB (NF)] 1 tab PO QPM 10/15/19 [History 

Confirmed 10/15/19]


Albuterol HFA INHALER* INH Q6HR 10/16/19 [History]


Bisacodyl [Dulcolax] 10 mg PO PRN 10/16/19 [History]


Mucinex Dm -30 mg Tablet PO BID 10/16/19 [History]


Oscal D /125* QPM 10/16/19 [History]








Review of Systems





- Measurements


Intake and Output: 


Intake and Output Last 24 Hours











 10/14/19 10/15/19 10/16/19 10/17/19





 04:59 04:59 04:59 04:59


 


Intake Total   100 700


 


Output Total   450 725


 


Balance   -350 -25


 


Weight   171 lb 8 oz 171 lb 12.8 oz


 


Intake:    


 


  IV Fluids    10


 


    Magnesium    10


 


  IVPB    55


 


    Magnesium    55


 


  Oral   100 635


 


Output:    


 


  Urine   450 650


 


  Liquid Stool    75


 


Other:    


 


  # Bowel Movements    1


 


  Estimated Stool Amount    Small














- Review of Systems


Constitutional Symptoms: Positive: Weight Gain


Pulmonary: Positive: Cough


Cardiology: Positive: Edema


Review of Systems Statement: 


All other review of systems negative, unless stated above.








Objective


Vital Signs:











Temp Pulse Resp BP Pulse Ox


 


 96.7 F   95   26   89/59   98 


 


 10/16/19 16:00  10/16/19 16:00  10/16/19 16:00  10/16/19 16:00  10/16/19 16:00








 Vital Signs - 12 hr











  Temp Pulse Resp BP Pulse Ox


 


 10/16/19 16:00  96.7 F  95  26  89/59  98


 


 10/16/19 15:15  97.6 F  87  20  88/61  100


 


 10/16/19 12:48  96.8 F  95  16  91/66  96


 


 10/16/19 11:05   105   114/68 


 


 10/16/19 07:18    20  


 


 10/16/19 07:15  97.3 F  84  20  94/60  97











Oxygen Devices in Use Now: Nasal Cannula


Appearance: older overweight, sitting 60 degrees, comfortable.


Eyes: PERRLA


Respiratory: - - Crackles bilaterally 1/2-2/3 lungfields.


Cardiovascular: RRR - summation taisha


Extremities: - - 2-3+ edema bilateral LE.


Skin: No Rash or Ulcers


Neurological: Alert and Oriented x 3


Laboratory Results: 


 





 10/15/19 20:35 





 10/16/19 09:25 





 











INR (Anticoag Therapy)  1.39  (0.82-1.09)  H  10/15/19  20:35    


 


APTT  34.5 seconds (26.0-38.0)   10/15/19  20:35    


 


Total Bilirubin  0.80 mg/dL (0.2-1.0)   10/16/19  09:25    


 


AST  40 U/L (13-39)  H  10/16/19  09:25    


 


ALT  78 U/L (7-52)  H  10/16/19  09:25    


 


Alkaline Phosphatase  117 U/L ()  H  10/16/19  09:25    


 


B-Natriuretic Peptide  > 1300 pg/mL (<=100)  H  10/15/19  20:35    


 


Total Protein  6.0 g/dL (6.4-8.9)  L  10/16/19  09:25    


 


Albumin  3.0 g/dL (3.2-5.2)  L  10/16/19  09:25    


 


Globulin  3.0 g/dL (2-4)   10/16/19  09:25    


 


Albumin/Globulin Ratio  1.0  (1-3)   10/16/19  09:25    


 


TSH  4.76 mcIU/mL (0.34-5.60)   10/16/19  09:20    








 











  10/15/19





  20:35


 


Troponin I  0.02











Diagnostic Imaging: 





Echo 10/14/19:


EF <20%.


1-2+ AI, 1-2+ MR, 2+ (moderate) TR, PA pressure 55 mmhg, normal RV systolic 

function.





CXR Evan 10/11/19: +CHF


EKG Data: 





Serial ECG's reviewed, no acute changes over several years.





Assessment/Plan





72 yo with BiV CHF and severe LV dysfunction.  20 lb weight gain from baseline.


BP dropped on aldactone and Coreg in [place].


Tolerating dieresis today.





Patient appears in better health than her heart function is.





CM:


Could be as old as a year based on 1 year hx orthopnea, coughing.


LE edema was not present until after ortho sx.


Fall that caused hip fx could have been from an arrhythmia, pt in [employer] and 

just found herself on the ground.


Thyroid studies normal, large differential if non ischemic.





Diurese for now.


Needs cath to determine if ischemic vs non ischemic, discussed with 

interventional cardiology, tentatively do diagnostic cath here if able to diuese

/manage adequately here.





If unable to diurese further due to low BP, I would recommend ICU, pressors and 

diurestics.





Transfer PRN refractory to management here, need for inpatient instead of out 

patient CHF expertise.





Pt will need an external ICD when d/c'd as well.

## 2019-10-17 VITALS — DIASTOLIC BLOOD PRESSURE: 63 MMHG | SYSTOLIC BLOOD PRESSURE: 85 MMHG

## 2019-10-17 LAB
ANION GAP SERPL CALC-SCNC: 5 MMOL/L (ref 2–11)
BUN SERPL-MCNC: 12 MG/DL (ref 6–24)
BUN/CREAT SERPL: 16.4 (ref 8–20)
CALCIUM SERPL-MCNC: 8.7 MG/DL (ref 8.6–10.3)
CHLORIDE SERPL-SCNC: 98 MMOL/L (ref 101–111)
FOLATE SERPL-MCNC: > 20 NG/ML (ref 3.99–?)
GLUCOSE SERPL-MCNC: 106 MG/DL (ref 70–100)
HCO3 SERPL-SCNC: 34 MMOL/L (ref 22–32)
POTASSIUM SERPL-SCNC: 4.1 MMOL/L (ref 3.5–5)
SODIUM SERPL-SCNC: 137 MMOL/L (ref 135–145)

## 2019-10-17 RX ADMIN — ENOXAPARIN SODIUM SCH MG: 40 INJECTION SUBCUTANEOUS at 10:00

## 2019-10-17 RX ADMIN — FUROSEMIDE SCH MG: 10 INJECTION, SOLUTION INTRAMUSCULAR; INTRAVENOUS at 10:01

## 2019-10-17 RX ADMIN — MOMETASONE FUROATE AND FORMOTEROL FUMARATE DIHYDRATE SCH PUFF: 100; 5 AEROSOL RESPIRATORY (INHALATION) at 07:31

## 2019-10-17 RX ADMIN — ASPIRIN SCH MG: 81 TABLET, CHEWABLE ORAL at 10:00

## 2019-10-17 RX ADMIN — FLUTICASONE PROPIONATE SCH SPRAY: 50 SPRAY, METERED NASAL at 10:00

## 2019-10-17 RX ADMIN — THERA TABS SCH TAB: TAB at 10:00

## 2019-10-17 NOTE — TRS
CC:  Dr. Salazar *

 

DATE OF ADMISSION:  10/15/2019.

 

DATE OF TRANSFER TO [facility]:  10/17/2019.

 

PRIMARY CARE PHYSICIAN:  Dr. Salazar.

 

PRINCIPAL DIAGNOSIS:  Acute decompensated systolic congestive heart failure 
with severe systolic dysfunction with an EF estimated to be less than 20 
percent.

 

SECONDARY DIAGNOSES:  Hyperlipidemia, hypertension, seasonal allergies.

 

DISCHARGE MEDICATIONS:

1.  Tylenol 650 mg p.o. q.6 hours prn pain.

2.  Aspirin 81 mg p.o. daily.

3.  Lipitor 10 mg p.o. at bedtime.

4.  Cetirizine 10 mg p.o. at bedtime.

5.  Lovenox 40 mg subcutaneous daily.

6.  Flonase two squirts both nostrils daily.

7.  Lasix 20 mg IV b.i.d.

8.  Dulera 100/5 two puffs inhaled twice daily.

9.  Multivitamin one tab p.o. daily.

 

HOSPITAL COURSE:  Ms. Sanchez is a 73-year-old female who was transferred to 
[facility] from [facility] after being admitted to [facility] 
on 10/11/2019.  She was transferred to [facility] on 10/15/2019.  
The patient was diagnosed with severe systolic dysfunction and acute 
decompensated systolic congestive heart failure while at [facility].  Her blood 
pressures were soft and she was unable to be effectively diuresed.  The nurse 
practitioner at [facility] spoke with Dr. Tian who recommended transfer to 
[facility].  The patient was subsequently seen by Cardiology at [facility] 
on 10/16/2019.  The patient is felt to need catheterization to determine if her 
systolic dysfunction is ischemic versus nonischemic.  Her case was discussed by 
Dr. Leal with Interventional Cardiology.  Ultimately, the decision was made 
to speak with the advanced heart failure specialists at [facility].  The patient has been accepted in transfer to [facility] today, 10/17/2019.  
She will be going straight to the catheterization lab.  Currently, the patient 
is stable on two liters of oxygen. Her blood pressure is low at 85/63, though 
she is asymptomatic with this.  Her heart rate is mildly elevated in the 90s.  
She has marked bilateral lower extremity pitting edema with the right being 
worse than the left.  Her cardiac exam reveals a normal S1, S2 with a regular 
rate and rhythm.  Her lungs have crackles about senior living up bilaterally.  Her 
abdomen is soft, nontender, nondistended.

 

FOLLOW-UP CONCERNS:  The patient is being transferred to [facility] today, 10/17/2019.

 

ACTIVITY LEVEL:  Up with assistance.

 

CONDITION ON DISCHARGE:  Guarded.

 

DIET:  NPO in preparation of going to the cath lab at [facility].

 

The patient was accepted to [facility] by Dr. Barrios.

 

TIME SPENT:  Thirty-five minutes were spent discharging this patient.

 

 [id number]/CPS #: [id number]

Maria Fareri Children's HospitalKAVIN

## 2019-10-17 NOTE — PN
<Bhavna Sotelo - Last Filed: 10/17/19 08:47>





Subjective


Date of Service: 10/17/19 - Newly found decompensated SHF


Interval History: 





No events last night. Although SBP has been in 80-90's she is asymptomatic. 

Continues to deny dizziness, lightheadedness, chest pain. She adds breathing 

effort and leg heaviness has improved. She reports + urinary output. Offers no 

other complaints at this time.








Medications


Active Medications: 








Acetaminophen (Tylenol Tab*)  650 mg PO Q6H PRN


   PRN Reason: MILD PAIN OR FEVER > 100.4


   Last Admin: 10/16/19 12:01 Dose:  650 mg


Aspirin (Aspirin 81 Mg Chew Tab*)  81 mg PO DAILY [facility]


   Last Admin: 10/16/19 08:23 Dose:  81 mg


Atorvastatin Calcium (Lipitor*)  10 mg PO QPM [facility]


   Last Admin: 10/16/19 17:25 Dose:  10 mg


Cetirizine HCl (Zyrtec*)  10 mg PO QPM [facility]


   Last Admin: 10/16/19 17:25 Dose:  10 mg


Enoxaparin Sodium (Lovenox(*))  40 mg SUBCUT Q24H [facility]


   Last Admin: 10/16/19 08:24 Dose:  40 mg


Fluticasone Propionate (Flonase Nasal Spray 50mcg*)  2 spray BOTH NARES DAILY 

[facility]


   Last Admin: 10/16/19 08:23 Dose:  2 spray


Furosemide (Lasix Iv*)  20 mg IV BID [facility]


   Last Admin: 10/16/19 21:31 Dose:  20 mg


Mometasone Furoate/Formoterol Fumar (Dulera 100/5 Mdi*)  2 puff INH BID [facility]


   Last Admin: 10/17/19 07:31 Dose:  2 puff


Multivitamins/Minerals (Theragran/Minerals Tab*)  1 tab PO DAILY [facility]


   Last Admin: 10/16/19 08:23 Dose:  1 tab








Objective


Vital Signs:











Temp Pulse Resp BP Pulse Ox


 


 97.9 F   95   18   85/63   96 


 


 10/17/19 07:15  10/17/19 07:34  10/17/19 07:36  10/17/19 07:15  10/17/19 07:34











Oxygen Devices in Use Now: Nasal Cannula


Appearance: Sitting in chair, NAD, non productive cough, A+O x3. cooperative


Eyes: PERRLA


Ears/Nose/Mouth/Throat: NL Teeth, Lips, Gums, Clear Oropharnyx, Mucous 

Membranes Moist


Neck: - - + JVD, no carotid bruit.


Respiratory: - - Crackles bilaterally 1/2-2/3 lungfields.


Cardiovascular: RRR - summation taisha


Extremities: - - 2-3+ edema bilateral LE.


Skin: No Rash or Ulcers


Neurological: Alert and Oriented x 3


Lines/Tubes/Other Access: Clean, Dry and Intact Peripheral IV


Laboratory Results: 


 





 10/15/19 20:35 





 10/16/19 09:25 





 











INR (Anticoag Therapy)  1.39  (0.82-1.09)  H  10/15/19  20:35    


 


APTT  34.5 seconds (26.0-38.0)   10/15/19  20:35    


 


Total Bilirubin  0.80 mg/dL (0.2-1.0)   10/16/19  09:25    


 


AST  40 U/L (13-39)  H  10/16/19  09:25    


 


ALT  78 U/L (7-52)  H  10/16/19  09:25    


 


Alkaline Phosphatase  117 U/L ()  H  10/16/19  09:25    


 


B-Natriuretic Peptide  > 1300 pg/mL (<=100)  H  10/15/19  20:35    


 


Total Protein  6.0 g/dL (6.4-8.9)  L  10/16/19  09:25    


 


Albumin  3.0 g/dL (3.2-5.2)  L  10/16/19  09:25    


 


Globulin  3.0 g/dL (2-4)   10/16/19  09:25    


 


Albumin/Globulin Ratio  1.0  (1-3)   10/16/19  09:25    


 


TSH  4.76 mcIU/mL (0.34-5.60)   10/16/19  09:20    








 











  10/15/19





  20:35


 


Troponin I  0.02








 Laboratory Results - last 24 hr











  10/16/19 10/16/19





  09:20 09:25


 


Sodium   137


 


Potassium   4.2


 


Chloride   101


 


Carbon Dioxide   33 H


 


Anion Gap   3


 


BUN   11


 


Creatinine   0.67


 


Est GFR ( Amer)   103.8


 


Est GFR (Non-Af Amer)   85.8


 


BUN/Creatinine Ratio   16.4


 


Glucose   115 H


 


Calcium   8.4 L


 


Magnesium   2.3


 


Total Bilirubin   0.80


 


AST   40 H


 


ALT   78 H


 


Alkaline Phosphatase   117 H


 


Total Protein   6.0 L


 


Albumin   3.0 L


 


Globulin   3.0


 


Albumin/Globulin Ratio   1.0


 


TSH  4.76 


 


Free T4  1.08 


 


Free T3  3.10 











Diagnostic Imaging: 





Echo 10/14/19:


EF <20%.


1-2+ AI, 1-2+ MR, 2+ (moderate) TR, PA pressure 55 mmhg, normal RV systolic 

function.





CXR [place] [facility] 10/11/19: +CHF


EKG Data: 





Serial ECG's reviewed, no acute changes over several years.





telemetry; reviewed Sinus tachycardia. Rate 100-110 with occasional PVCs no VT.





Assessment/Plan





#1 Newly diagnosed SHF; LVEF <20%, LVIDd 6.5cm with moderate to severe PHTN and 

mild to moderate MR. She is slowly responding to IV Lasix 20mg BID, SBP 80-90's 

although she is not symptomatic. Will continue current dose. Trial of Coreg and 

Aldactone at outlying facility was discontinued due to hypotension. Etiology 

not clear. TFTs are normal. She will need eventual R/LHC and lifevest. Decision 

as to whether or not patient is to have proceed her can be re addressed once 

compensated and able to undergo cardiac cath. Continue Na+ and fluid 

restriction with accurate I+O with daily weights. Presented at [facility] with 

troponinemia. Peaked at presentation .09. No ischemic ECG changes appreciated. 

+ severe global hypokinesis noted on echo. no RWMA. If unable to diurese 

further due to low BP, I would recommend ICU, pressors and diurestics





#2 Hypotension; Upon review of records it appears she has a tendency for 

orthopstatic hypotension. Thus, not on medications for previous DX of HTN. She 

is asymptomatic at this time.





#3 Transaminitis; improving with diuresis. Likely due to above #1. Coagulopathy 

also improved with diuresis. 





#4 Moderate to severe PHTN; currently receiving diuresis. Will need R/LHC once 

stable. 





#5 Disposition pending course; management limited by hemodynamics however, she 

is not symptomatic and tolerating gentle IV diuresis. Will follow closely. Will 

d/w Dr. Madden. 
































Attending: Tyrell Madden





<Tyrell Madden - Last Filed: 10/17/19 13:02>





Objective


Vital Signs:











Temp Pulse Resp BP Pulse Ox


 


 97.9 F   95   18   85/63   96 


 


 10/17/19 07:15  10/17/19 07:34  10/17/19 07:36  10/17/19 07:15  10/17/19 07:34











Laboratory Results: 


 





 10/15/19 20:35 





 10/17/19 08:26 





 











INR (Anticoag Therapy)  1.39  (0.82-1.09)  H  10/15/19  20:35    


 


APTT  34.5 seconds (26.0-38.0)   10/15/19  20:35    


 


Total Bilirubin  0.80 mg/dL (0.2-1.0)   10/16/19  09:25    


 


AST  40 U/L (13-39)  H  10/16/19  09:25    


 


ALT  78 U/L (7-52)  H  10/16/19  09:25    


 


Alkaline Phosphatase  117 U/L ()  H  10/16/19  09:25    


 


B-Natriuretic Peptide  > 1300 pg/mL (<=100)  H  10/15/19  20:35    


 


Total Protein  6.0 g/dL (6.4-8.9)  L  10/16/19  09:25    


 


Albumin  3.0 g/dL (3.2-5.2)  L  10/16/19  09:25    


 


Globulin  3.0 g/dL (2-4)   10/16/19  09:25    


 


Albumin/Globulin Ratio  1.0  (1-3)   10/16/19  09:25    


 


TSH  4.76 mcIU/mL (0.34-5.60)   10/16/19  09:20    








 











  10/15/19





  20:35


 


Troponin I  0.02














Assessment/Plan





Reviewed with Ms. Sotelo, Dr. Leal, and our interventionalist.  


Given severe CHF decompensation and Severe biventricular dysfunction, NSVT, and 

h/o syncope, we will arrange transfer to an advanced CHF center for futher 

evaluation and rx and possible EP consultation/intervention.  SONIA Madden MD.